# Patient Record
Sex: MALE | Race: WHITE | NOT HISPANIC OR LATINO | Employment: OTHER | URBAN - METROPOLITAN AREA
[De-identification: names, ages, dates, MRNs, and addresses within clinical notes are randomized per-mention and may not be internally consistent; named-entity substitution may affect disease eponyms.]

---

## 2017-04-06 ENCOUNTER — TRANSFERRED RECORDS (OUTPATIENT)
Dept: HEALTH INFORMATION MANAGEMENT | Facility: CLINIC | Age: 67
End: 2017-04-06

## 2017-08-30 ENCOUNTER — TRANSFERRED RECORDS (OUTPATIENT)
Dept: HEALTH INFORMATION MANAGEMENT | Facility: CLINIC | Age: 67
End: 2017-08-30

## 2018-10-18 ENCOUNTER — OFFICE VISIT (OUTPATIENT)
Dept: FAMILY MEDICINE | Facility: CLINIC | Age: 68
End: 2018-10-18
Payer: MEDICARE

## 2018-10-18 VITALS
DIASTOLIC BLOOD PRESSURE: 81 MMHG | RESPIRATION RATE: 18 BRPM | HEART RATE: 63 BPM | TEMPERATURE: 98 F | HEIGHT: 70 IN | WEIGHT: 177 LBS | SYSTOLIC BLOOD PRESSURE: 155 MMHG | OXYGEN SATURATION: 98 % | BODY MASS INDEX: 25.34 KG/M2

## 2018-10-18 DIAGNOSIS — Z85.828 HISTORY OF SKIN CANCER: ICD-10-CM

## 2018-10-18 DIAGNOSIS — L21.9 SEBORRHEIC DERMATITIS: Primary | ICD-10-CM

## 2018-10-18 DIAGNOSIS — R10.13 DYSPEPSIA: ICD-10-CM

## 2018-10-18 DIAGNOSIS — M54.6 MIDLINE THORACIC BACK PAIN, UNSPECIFIED CHRONICITY: ICD-10-CM

## 2018-10-18 DIAGNOSIS — M54.50 MIDLINE LOW BACK PAIN WITHOUT SCIATICA, UNSPECIFIED CHRONICITY: ICD-10-CM

## 2018-10-18 DIAGNOSIS — R03.0 ELEVATED BLOOD PRESSURE READING WITHOUT DIAGNOSIS OF HYPERTENSION: ICD-10-CM

## 2018-10-18 DIAGNOSIS — Z00.00 PREVENTATIVE HEALTH CARE: ICD-10-CM

## 2018-10-18 LAB
ALBUMIN UR-MCNC: NEGATIVE MG/DL
APPEARANCE UR: CLEAR
BASOPHILS # BLD AUTO: 0 10E9/L (ref 0–0.2)
BASOPHILS NFR BLD AUTO: 0.1 %
BILIRUB UR QL STRIP: NEGATIVE
COLOR UR AUTO: YELLOW
DIFFERENTIAL METHOD BLD: NORMAL
EOSINOPHIL # BLD AUTO: 0 10E9/L (ref 0–0.7)
EOSINOPHIL NFR BLD AUTO: 0.6 %
ERYTHROCYTE [DISTWIDTH] IN BLOOD BY AUTOMATED COUNT: 12.9 % (ref 10–15)
GLUCOSE UR STRIP-MCNC: NEGATIVE MG/DL
HCT VFR BLD AUTO: 41.7 % (ref 40–53)
HGB BLD-MCNC: 13.5 G/DL (ref 13.3–17.7)
HGB UR QL STRIP: NEGATIVE
KETONES UR STRIP-MCNC: NEGATIVE MG/DL
LEUKOCYTE ESTERASE UR QL STRIP: NEGATIVE
LYMPHOCYTES # BLD AUTO: 1.2 10E9/L (ref 0.8–5.3)
LYMPHOCYTES NFR BLD AUTO: 17.1 %
MCH RBC QN AUTO: 30.6 PG (ref 26.5–33)
MCHC RBC AUTO-ENTMCNC: 32.4 G/DL (ref 31.5–36.5)
MCV RBC AUTO: 95 FL (ref 78–100)
MONOCYTES # BLD AUTO: 0.5 10E9/L (ref 0–1.3)
MONOCYTES NFR BLD AUTO: 7.1 %
NEUTROPHILS # BLD AUTO: 5.2 10E9/L (ref 1.6–8.3)
NEUTROPHILS NFR BLD AUTO: 75.1 %
NITRATE UR QL: NEGATIVE
PH UR STRIP: 5 PH (ref 5–7)
PLATELET # BLD AUTO: 244 10E9/L (ref 150–450)
RBC # BLD AUTO: 4.41 10E12/L (ref 4.4–5.9)
SOURCE: NORMAL
SP GR UR STRIP: 1.02 (ref 1–1.03)
UROBILINOGEN UR STRIP-ACNC: 0.2 EU/DL (ref 0.2–1)
WBC # BLD AUTO: 6.9 10E9/L (ref 4–11)

## 2018-10-18 PROCEDURE — 80053 COMPREHEN METABOLIC PANEL: CPT | Performed by: FAMILY MEDICINE

## 2018-10-18 PROCEDURE — 99203 OFFICE O/P NEW LOW 30 MIN: CPT | Performed by: FAMILY MEDICINE

## 2018-10-18 PROCEDURE — 81003 URINALYSIS AUTO W/O SCOPE: CPT | Performed by: FAMILY MEDICINE

## 2018-10-18 PROCEDURE — 84443 ASSAY THYROID STIM HORMONE: CPT | Performed by: FAMILY MEDICINE

## 2018-10-18 PROCEDURE — 85025 COMPLETE CBC W/AUTO DIFF WBC: CPT | Performed by: FAMILY MEDICINE

## 2018-10-18 PROCEDURE — 80061 LIPID PANEL: CPT | Performed by: FAMILY MEDICINE

## 2018-10-18 PROCEDURE — 36415 COLL VENOUS BLD VENIPUNCTURE: CPT | Performed by: FAMILY MEDICINE

## 2018-10-18 NOTE — MR AVS SNAPSHOT
After Visit Summary   10/18/2018    Ignacio Sarmiento    MRN: [id number]           Patient Information     Date Of Birth          1950        Visit Information        Provider Department      10/18/2018 2:30 PM Julio Cesar Chávez MD [facility]        Today's Diagnoses     Seborrheic dermatitis    -  1    History of skin cancer        Dyspepsia        Midline thoracic back pain, unspecified chronicity        Elevated blood pressure reading without diagnosis of hypertension        Preventative health care        Midline low back pain without sciatica, unspecified chronicity          Care Instructions    Famotidine (pepcid) 20 or ranitidine(zantac) 150    New Shingles Vaccination @ pharmacy              Follow-ups after your visit        Additional Services     DERMATOLOGY REFERRAL       Your provider has referred you to: [facility]: [facility] [place] [phone]    Please be aware that coverage of these services is subject to the terms and limitations of your health insurance plan.  Call member services at your health plan with any benefit or coverage questions.      Please bring the following with you to your appointment:    (1) Any X-Rays, CTs or MRIs which have been performed.  Contact the facility where they were done to arrange for  prior to your scheduled appointment.    (2) List of current medications  (3) This referral request   (4) Any documents/labs given to you for this referral                  Follow-up notes from your care team     Return in about 1 year (around 10/18/2019).      Who to contact     If you have questions or need follow up information about today's clinic visit or your schedule please contact [facility] directly at [phone].  Normal or non-critical lab and imaging results will be communicated to you by MyChart, letter or phone within 4 business days after the clinic has received the results. If you do not  "hear from us within 7 days, please contact the clinic through Pipette or phone. If you have a critical or abnormal lab result, we will notify you by phone as soon as possible.  Submit refill requests through Pipette or call your pharmacy and they will forward the refill request to us. Please allow 3 business days for your refill to be completed.          Additional Information About Your Visit        MapMyID[place] Information     Pipette lets you send messages to your doctor, view your test results, renew your prescriptions, schedule appointments and more. To sign up, go to [email or web address] . Click on \"Log in\" on the left side of the screen, which will take you to the Welcome page. Then click on \"Sign up Now\" on the right side of the page.     You will be asked to enter the access code listed below, as well as some personal information. Please follow the directions to create your username and password.     Your access code is: [email or web address]  Expires: 2019  3:34 PM     Your access code will  in 90 days. If you need help or a new code, please call your [place] clinic or [phone].        Care EveryWhere ID     This is your Care EveryWhere ID. This could be used by other organizations to access your [place] medical records  [id number]        Your Vitals Were     Pulse Temperature Respirations Height Pulse Oximetry BMI (Body Mass Index)    63 98  F (36.7  C) (Oral) 18 5' 9.5\" (1.765 m) 98% 25.76 kg/m2       Blood Pressure from Last 3 Encounters:   10/18/18 155/81    Weight from Last 3 Encounters:   10/18/18 177 lb (80.3 kg)              We Performed the Following     *UA reflex to Microscopic and Culture ([facility] (except [place] and [place])     CBC with platelets and differential     Comprehensive metabolic panel     DERMATOLOGY REFERRAL     Lipid panel reflex to direct LDL Non-fasting     TSH with free T4 reflex          Today's Medication Changes          These changes are " accurate as of 10/18/18 11:59 PM.  If you have any questions, ask your nurse or doctor.               Start taking these medicines.        Dose/Directions    nabumetone 750 MG tablet   Commonly known as:  RELAFEN   Used for:  Midline thoracic back pain, unspecified chronicity   Started by:  Julio Cesar Chávez MD        Dose:  750 mg   Take 1 tablet (750 mg) by mouth daily   Quantity:  90 tablet   Refills:  0            Where to get your medicines      These medications were sent to [id number] - [place], [place] - [address]  [address], [facility] [address]     Phone:  [phone]     nabumetone 750 MG tablet                Primary Care Provider Fax #    Physician No Ref-Primary [phone]       No address on file        Equal Access to Services     SVEN CONNORS : Trista pererao Soclaudiaali, waaxda luqadaha, qaybta kaalmada adeegyada, carlos jean. So [facility] [phone].    ATENCIÓN: Si habla español, tiene a silver disposición servicios gratuitos de asistencia lingüística. Ll[facility] [phone].    We comply with applicable federal civil rights laws and [place] laws. We do not discriminate on the basis of race, color, national origin, age, disability, sex, sexual orientation, or gender identity.            Thank you!     Thank you for choosing [facility]  for your care. Our goal is always to provide you with excellent care. Hearing back from our patients is one way we can continue to improve our services. Please take a few minutes to complete the written survey that you may receive in the mail after your visit with us. Thank you!             Your Updated Medication List - Protect others around you: Learn how to safely use, store and throw away your medicines at www.disposemymeds.org.          This list is accurate as of 10/18/18 11:59 PM.  Always use your most recent med list.                   Brand Name Dispense Instructions for use  Diagnosis    nabumetone 750 MG tablet    RELAFEN    90 tablet    Take 1 tablet (750 mg) by mouth daily    Midline thoracic back pain, unspecified chronicity

## 2018-10-18 NOTE — PROGRESS NOTES
SUBJECTIVE:   Ignacio Sarmiento is a 68 year old male who presents to clinic today for the following health issues:    Seborrheic dermatitis  (primary encounter diagnosis) -uses a topical steroid.  Current to [facility] from 2011.  He would like a refill no current activity    History of skin cancer - Basal cell.  He would like a dermatology referral    Dyspepsia - Using wife's Prilosec for heart burn occ occurs when weightlifting.    Midline thoracic back pain, unspecified chronicity  -occurs when bench pressing.  Stops when he quits his stop bench pressing lately.  It does not occur with other physical activity.  He is stopped bench pressing, and it has not recurred   no associated symptoms    Elevated blood pressure reading without diagnosis of hypertension - Patient experiences white coat syndrome. Has at home BP monitor.  BP Readings from Last 3 Encounters:   10/18/18 155/81     Repeat similar    HPI      Past Medical History:   Diagnosis Date     Cataracts, bilateral      Dyspepsia 10/18/2018     Elevated blood pressure reading without diagnosis of hypertension 10/18/2018     History of skin cancer 10/18/2018     Midline low back pain without sciatica 10/19/2018     Midline thoracic back pain, unspecified chronicity 10/18/2018     Seborrheic dermatitis 10/18/2018       Past Surgical History:   Procedure Laterality Date     ARTHROSCOPY KNEE Right      ARTHROSCOPY KNEE WITH MENISCECTOMY Left     open       History reviewed. No pertinent family history.    Social History   Substance Use Topics     Smoking status: Former Smoker     Smokeless tobacco: Never Used     Alcohol use Yes      Comment: socially     [facility]         ROS:  No systemic symptoms  No itch  No chest pain  No dyspnea  No edema [facility]      This document serves as a record of the services and decisions personally performed and made by Julio Cesar Chávez  "MD. It was created on his behalf by Tova Hayes, a trained medical scribe. The creation of this document is based on the provider's statements to the medical scribe.  Tova Hayes October 18, 2018 3:17 PM      OBJECTIVE:     /81 (BP Location: Left arm, Patient Position: Chair, Cuff Size: Adult Large)  Pulse 63  Temp 98  F (36.7  C) (Oral)  Resp 18  Ht 5' 9.5\" (1.765 m)  Wt 177 lb (80.3 kg)  SpO2 98%  BMI 25.76 kg/m2  Body mass index is 25.76 kg/(m^2).  CHEST: Clear to auscultation, respirations unlabored  HEART: S1S2 RRR no murmur nor apical displacement  ABDOMEN: soft, nontender, no hepatosplenomegaly, no masses and bowel sounds normal  No edema no synovitis    back is nontender to percussion  And inspection    Diagnostic Test Results:  Results for orders placed or performed in visit on 10/18/18 (from the past 24 hour(s))   CBC with platelets and differential   Result Value Ref Range    WBC 6.9 4.0 - 11.0 10e9/L    RBC Count 4.41 4.4 - 5.9 10e12/L    Hemoglobin 13.5 13.3 - 17.7 g/dL    Hematocrit 41.7 40.0 - 53.0 %    MCV 95 78 - 100 fl    MCH 30.6 26.5 - 33.0 pg    MCHC 32.4 31.5 - 36.5 g/dL    RDW 12.9 10.0 - 15.0 %    Platelet Count 244 150 - 450 10e9/L    % Neutrophils 75.1 %    % Lymphocytes 17.1 %    % Monocytes 7.1 %    % Eosinophils 0.6 %    % Basophils 0.1 %    Absolute Neutrophil 5.2 1.6 - 8.3 10e9/L    Absolute Lymphocytes 1.2 0.8 - 5.3 10e9/L    Absolute Monocytes 0.5 0.0 - 1.3 10e9/L    Absolute Eosinophils 0.0 0.0 - 0.7 10e9/L    Absolute Basophils 0.0 0.0 - 0.2 10e9/L    Diff Method Automated Method    *UA reflex to Microscopic and Culture ([place] and [facility] (except [place] and [place])   Result Value Ref Range    Color Urine Yellow     Appearance Urine Clear     Glucose Urine Negative NEG^Negative mg/dL    Bilirubin Urine Negative NEG^Negative    Ketones Urine Negative NEG^Negative mg/dL    Specific Gravity Urine 1.020 1.003 - 1.035    Blood Urine Negative NEG^Negative "    pH Urine 5.0 5.0 - 7.0 pH    Protein Albumin Urine Negative NEG^Negative mg/dL    Urobilinogen Urine 0.2 0.2 - 1.0 EU/dL    Nitrite Urine Negative NEG^Negative    Leukocyte Esterase Urine Negative NEG^Negative    Source Midstream Urine        ASSESSMENT/PLAN:   ASSESSMENT / PLAN:  (L21.9) Seborrheic dermatitis  (primary encounter diagnosis)  Comment: At the moment quiescent  Plan: Discussed    (Z85.828) History of skin cancer  Comment:  no particular lesions at this time  Plan: DERMATOLOGY REFERRAL         Per his request    (R10.13) Dyspepsia  Comment: Discussed Prilosec study  Plan: He is going to try to do without PPI,  ranitidine     (M54.6) Midline thoracic back pain, unspecified chronicity  Comment: Discussed physical therapy  Plan: nabumetone (RELAFEN) 750 MG tablet        [facility] we should check an alkaline phosphatase    (R03.0) Elevated blood pressure reading without diagnosis of hypertension  Comment: He believes this is whitecoat  Plan: *He will get blood pressures here 3 times weekly     (Z00.00) Preventative health care  Comment: Our database is slim  Plan: CBC with platelets and differential, TSH with         free T4 reflex, Comprehensive metabolic panel,         *UA reflex to Microscopic and Culture ([place]         and [facility] (except [place] and         [place]), Lipid panel reflex to direct LDL         Non-fasting     Records requested from the [facility]    (M54.5) Midline low back pain without sciatica, unspecified chronicity  Comment: He treats this with exercise  Plan: Positive strokes          Julio Cesar Chávez MD      The information in this document, created by the medical scribe for me, accurately reflects the services I personally performed and the decisions made by me. I have reviewed and approved this document for accuracy prior to leaving the patient care area.  October 18, 2018 3:17 PM    Julio Cesar Chávez MD  [facility]

## 2018-10-19 PROBLEM — M54.50 MIDLINE LOW BACK PAIN WITHOUT SCIATICA: Status: ACTIVE | Noted: 2018-10-19

## 2018-10-19 LAB
ALBUMIN SERPL-MCNC: 4.1 G/DL (ref 3.4–5)
ALP SERPL-CCNC: 68 U/L (ref 40–150)
ALT SERPL W P-5'-P-CCNC: 21 U/L (ref 0–70)
ANION GAP SERPL CALCULATED.3IONS-SCNC: 5 MMOL/L (ref 3–14)
AST SERPL W P-5'-P-CCNC: 16 U/L (ref 0–45)
BILIRUB SERPL-MCNC: 0.6 MG/DL (ref 0.2–1.3)
BUN SERPL-MCNC: 12 MG/DL (ref 7–30)
CALCIUM SERPL-MCNC: 9.2 MG/DL (ref 8.5–10.1)
CHLORIDE SERPL-SCNC: 105 MMOL/L (ref 94–109)
CHOLEST SERPL-MCNC: 205 MG/DL
CO2 SERPL-SCNC: 29 MMOL/L (ref 20–32)
CREAT SERPL-MCNC: 0.86 MG/DL (ref 0.66–1.25)
GFR SERPL CREATININE-BSD FRML MDRD: 88 ML/MIN/1.7M2
GLUCOSE SERPL-MCNC: 113 MG/DL (ref 70–99)
HDLC SERPL-MCNC: 84 MG/DL
LDLC SERPL CALC-MCNC: 101 MG/DL
NONHDLC SERPL-MCNC: 121 MG/DL
POTASSIUM SERPL-SCNC: 3.8 MMOL/L (ref 3.4–5.3)
PROT SERPL-MCNC: 7.8 G/DL (ref 6.8–8.8)
SODIUM SERPL-SCNC: 139 MMOL/L (ref 133–144)
TRIGL SERPL-MCNC: 99 MG/DL
TSH SERPL DL<=0.005 MIU/L-ACNC: 1.43 MU/L (ref 0.4–4)

## 2018-10-19 NOTE — PROGRESS NOTES
Tests are all quite good. Your heart attack risk, however, is 1 in 6, which we consider high. I would recommend a statin, as we discussed. May I prescribe it?      ANDREY SANTANA

## 2018-10-22 ENCOUNTER — TELEPHONE (OUTPATIENT)
Dept: FAMILY MEDICINE | Facility: CLINIC | Age: 68
End: 2018-10-22

## 2018-10-22 DIAGNOSIS — E78.5 HYPERLIPIDEMIA LDL GOAL <160: Primary | ICD-10-CM

## 2018-10-22 RX ORDER — ATORVASTATIN CALCIUM 40 MG/1
40 TABLET, FILM COATED ORAL DAILY
Qty: 90 TABLET | Refills: 3 | Status: SHIPPED | OUTPATIENT
Start: 2018-10-22 | End: 2019-10-02

## 2018-10-22 NOTE — TELEPHONE ENCOUNTER
Results and message given to patient, he states he would like his statin sent to [facility] in [place], he states he is currently at the airport heading to [place], he would like to make a phone visit when he gets settled in his condo to go through results with Dr. ANISH Cuevas/[facility]

## 2018-10-24 ENCOUNTER — TELEPHONE (OUTPATIENT)
Dept: FAMILY MEDICINE | Facility: CLINIC | Age: 68
End: 2018-10-24

## 2018-10-24 NOTE — TELEPHONE ENCOUNTER
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  Earliest return to [place] Nov 5. Received our call with lab results and statin recommendation.  Ignacio considering taking med, will discuss with pharmacist and call back if decides NOT to take atorvastatin 40 mg.   Comments about his BP white coat syndrome  BP Readings from Last 1 Encounters:   10/18/18 155/81     Pt states will monitor BP in [place] and call with log.     His sister, a nurse, recommended he stop running because heart attack risk is 1 in 6 which we consider high   Runs every other day 4 - 4-1/2 miles at high pace.  OK to continue running?   He will stop running until BW comments.    # above OK detailed message.  Ganga Whittaker, RN

## 2018-10-25 NOTE — TELEPHONE ENCOUNTER
Pt calls, discussed lipids at length, pt 5 hours earlier in [place] so wants no early calls unless needed, worried them, did  atorvastatin in [place], will be back early November, [facility], will call prn, informed recommendation per ANISH Melendez, RN, BSN  Message handled by Nurse Triage.

## 2018-11-05 ENCOUNTER — VIRTUAL VISIT (OUTPATIENT)
Dept: FAMILY MEDICINE | Facility: CLINIC | Age: 68
End: 2018-11-05
Payer: MEDICARE

## 2018-11-05 DIAGNOSIS — R03.0 ELEVATED BLOOD PRESSURE READING WITHOUT DIAGNOSIS OF HYPERTENSION: ICD-10-CM

## 2018-11-05 DIAGNOSIS — E78.5 HYPERLIPIDEMIA LDL GOAL <160: Primary | ICD-10-CM

## 2018-11-05 PROCEDURE — 99207 ZZC NO BILLABLE SERVICE THIS VISIT: CPT | Performed by: FAMILY MEDICINE

## 2018-11-05 NOTE — PROGRESS NOTES
SUBJECTIVE:   Ignacio Sarmiento is a 68 year old male who presents to clinic today for the following health issues:          GO over lab results, Virtual visit     Problem list and histories reviewed & adjusted, as indicated.  Additional history: none        Reviewed and updated as needed this visit by clinical staff  Tobacco  Allergies  Meds  Med Hx  Surg Hx  Fam Hx  Soc Hx      Reviewed and updated as needed this visit by Provider                             The 10-year ASCVD risk score (Arash CLINTON Jr, et al., 2013) is: 15.7%    Values used to calculate the score:      Age: 68 years      Sex: Male      Is Non- : No      Diabetic: No      Tobacco smoker: No      Systolic Blood Pressure: 155 mmHg      Is BP treated: No      HDL Cholesterol: 84 mg/dL      Total Cholesterol: 205 mg/dL      Telephone conversation regarding cholesterol atorvastatin side effects benefit.  All questions answered.  He still has to have his blood pressure rechecked.  7 minutes  Julio Cesar Chávez                    [facility] M left  Julio Cesar Chávez

## 2018-11-05 NOTE — MR AVS SNAPSHOT
"              After Visit Summary   2018    Ignacio Sarmiento    MRN: [id number]           Patient Information     Date Of Birth          1950        Visit Information        Provider Department      2018 2:00 PM Julio Cesar Chávez MD [facility]        Today's Diagnoses     Hyperlipidemia LDL goal <160    -  1    Elevated blood pressure reading without diagnosis of hypertension           Follow-ups after your visit        Follow-up notes from your care team     Return in about 3 months (around 2019).      Who to contact     If you have questions or need follow up information about today's clinic visit or your schedule please contact [facility] directly at [phone].  Normal or non-critical lab and imaging results will be communicated to you by MyChart, letter or phone within 4 business days after the clinic has received the results. If you do not hear from us within 7 days, please contact the clinic through MyChart or phone. If you have a critical or abnormal lab result, we will notify you by phone as soon as possible.  Submit refill requests through Lionical or call your pharmacy and they will forward the refill request to us. Please allow 3 business days for your refill to be completed.          Additional Information About Your Visit        MyChart Information     Lionical lets you send messages to your doctor, view your test results, renew your prescriptions, schedule appointments and more. To sign up, go to [email or web address] . Click on \"Log in\" on the left side of the screen, which will take you to the Welcome page. Then click on \"Sign up Now\" on the right side of the page.     You will be asked to enter the access code listed below, as well as some personal information. Please follow the directions to create your username and password.     Your access code is: [email or web address]  Expires: 2019  2:34 PM     Your access code will  in 90 days. " If you need help or a new code, please call your [place] clinic or [phone].        Care EveryWhere ID     This is your Care EveryWhere ID. This could be used by other organizations to access your [place] medical records  [id number]         Blood Pressure from Last 3 Encounters:   10/18/18 155/81    Weight from Last 3 Encounters:   10/18/18 177 lb (80.3 kg)              Today, you had the following     No orders found for display       Primary Care Provider Office Phone # Fax #    Julio Cesar Chávez [phone] [address] [facility] [address]        Equal Access to Services     [facility]: Hadii aad ku hadasho Soomaali, waaxda luqadaha, qaybta kaalmada adeegyada, waxcastillo horn hayjanny shah . So [facility] [phone].    ATENCIÓN: Si habla español, tiene a silver disposición servicios gratuitos de asistencia lingüística. [facility] [phone].    We comply with applicable federal civil rights laws and [place] laws. We do not discriminate on the basis of race, color, national origin, age, disability, sex, sexual orientation, or gender identity.            Thank you!     Thank you for choosing [facility]  for your care. Our goal is always to provide you with excellent care. Hearing back from our patients is one way we can continue to improve our services. Please take a few minutes to complete the written survey that you may receive in the mail after your visit with us. Thank you!             Your Updated Medication List - Protect others around you: Learn how to safely use, store and throw away your medicines at www.disposemymeds.org.          This list is accurate as of 11/5/18  2:29 PM.  Always use your most recent med list.                   Brand Name Dispense Instructions for use Diagnosis    atorvastatin 40 MG tablet    LIPITOR    90 tablet    Take 1 tablet (40 mg) by mouth daily    Hyperlipidemia LDL goal <160       nabumetone 750 MG tablet     RELAFEN    90 tablet    Take 1 tablet (750 mg) by mouth daily    Midline thoracic back pain, unspecified chronicity

## 2018-11-09 PROBLEM — M19.079: Status: ACTIVE | Noted: 2018-11-09

## 2019-02-15 ENCOUNTER — TELEPHONE (OUTPATIENT)
Dept: FAMILY MEDICINE | Facility: CLINIC | Age: 69
End: 2019-02-15

## 2019-02-15 NOTE — TELEPHONE ENCOUNTER
Panel Management Review      Patient has the following on his problem list: None      Composite cancer screening  Chart review shows that this patient is due/due soon for the following Colonoscopy  Summary:    Patient is due/failing the following:   COLONOSCOPY    Action needed:   Patient needs referral/order: colonoscopy    Type of outreach:    panel pool    Questions for provider review:    None                                                                                                                                    Angy Cuevas/OSBALDO  [place]---[facility]       Chart routed to Care Team .

## 2019-02-28 NOTE — TELEPHONE ENCOUNTER
Type of outreach:  Phone, spoke to patient.  Patient had Colonoscopy somewhere else but not sure when. he will call us back when he figure out when his last one was.

## 2019-04-17 ENCOUNTER — ANCILLARY PROCEDURE (OUTPATIENT)
Dept: GENERAL RADIOLOGY | Facility: CLINIC | Age: 69
End: 2019-04-17
Attending: PHYSICIAN ASSISTANT
Payer: COMMERCIAL

## 2019-04-17 ENCOUNTER — OFFICE VISIT (OUTPATIENT)
Dept: URGENT CARE | Facility: URGENT CARE | Age: 69
End: 2019-04-17
Payer: COMMERCIAL

## 2019-04-17 VITALS
TEMPERATURE: 97.5 F | SYSTOLIC BLOOD PRESSURE: 162 MMHG | RESPIRATION RATE: 16 BRPM | OXYGEN SATURATION: 99 % | DIASTOLIC BLOOD PRESSURE: 80 MMHG | HEART RATE: 57 BPM

## 2019-04-17 DIAGNOSIS — T14.8XXA PUNCTURE WOUND: Primary | ICD-10-CM

## 2019-04-17 DIAGNOSIS — R22.31 MASS OF RIGHT FINGER: ICD-10-CM

## 2019-04-17 DIAGNOSIS — T14.8XXA PUNCTURE WOUND: ICD-10-CM

## 2019-04-17 DIAGNOSIS — M79.645 PAIN OF FINGER OF LEFT HAND: ICD-10-CM

## 2019-04-17 DIAGNOSIS — Z23 NEED FOR TDAP VACCINATION: ICD-10-CM

## 2019-04-17 PROCEDURE — 90471 IMMUNIZATION ADMIN: CPT | Performed by: PHYSICIAN ASSISTANT

## 2019-04-17 PROCEDURE — 90715 TDAP VACCINE 7 YRS/> IM: CPT | Performed by: PHYSICIAN ASSISTANT

## 2019-04-17 PROCEDURE — 73140 X-RAY EXAM OF FINGER(S): CPT | Mod: LT

## 2019-04-17 PROCEDURE — 99214 OFFICE O/P EST MOD 30 MIN: CPT | Mod: 25 | Performed by: PHYSICIAN ASSISTANT

## 2019-04-17 RX ORDER — HYDROCODONE BITARTRATE AND ACETAMINOPHEN 5; 325 MG/1; MG/1
1 TABLET ORAL
Qty: 4 TABLET | Refills: 0 | Status: SHIPPED | OUTPATIENT
Start: 2019-04-17 | End: 2019-07-01

## 2019-04-17 NOTE — PATIENT INSTRUCTIONS
Patient Education     Puncture Wound    A puncture wound occurs when a pointed object pushes into the skin. It may go into the tissues below the skin, including fat and muscle. This type of wound is narrow and deep and can be hard to clean. Because of this, puncture wounds are at high risk for becoming infected.  X-rays may be done to check the wound for objects stuck under the skin. Your may also need a tetanus shot. This is given if you are not up-to-date on this vaccination and the object that caused the wound may lead to tetanus.  Home care    Your healthcare provider may prescribe an antibiotic. This is to help prevent infection. Follow all instructions for taking this medicine. Take the medicine every day until it is gone or you are told to stop. You should not have any left over.    The healthcare provider may prescribe medicines for pain. Follow instructions for taking them.    You can take acetaminophen or ibuprofen for pain, unless you were given a different pain medicine to use.     Follow the healthcare provider s instructions on how to care for the wound.    Keep the wound clean and dry. Don't get the wound wet until you are told it is OK to do so. If the area gets wet, gently pat it dry with a clean cloth. Replace the wet bandage with a dry one.    If a bandage was applied and it becomes wet or dirty, replace it. Otherwise, leave it in place for the first 24 hours.    Once you can get the wound wet, you may shower as usual but don't soak the wound in water (no tub baths or swimming)    Even with proper treatment, a puncture wound may become infected. Check the wound daily for signs of infection listed below.  Follow-up care  Follow up with your healthcare provider, or as advised.   When to seek medical advice  Call your healthcare provider right away if any of these occur:    Signs of infection, including:  ? Increasing redness or swelling around the wound  ? Increased warmth of the  wound  ? Worsening pain  ? Red streaking lines away from the wound  ? Draining pus    Fever of 100.4 F (38. C) or higher, or as directed by your healthcare provider    Wound changes colors    Numbness around the wound    Decreased movement around the injured area  Date Last Reviewed: 3/1/2018    3074-2860 [employer]. [address] [place] [address]. All rights reserved. This information is not intended as a substitute for professional medical care. Always follow your healthcare professional's instructions.

## 2019-04-17 NOTE — PROGRESS NOTES
SUBJECTIVE:     Chief Complaint   Patient presents with     Urgent Care     Drill left thumb     Ignacio Sarmiento is a 68 year old male who presents to the clinic with a puncture on the left finger sustained 1 hour(s) ago.  Punctured across pad of thumb with no exit wound.   This is a non-work related injury.  Mechanism of injury: drill bit.  No loss of sensation or strength.  Patient also notes non-painful mobile mass at right thumb joint.  Not sure how long it has been present.     Associated symptoms: Denies numbness, weakness, or loss of function  Last tetanus booster within 10 years: no    No family history on file.    Social  Just moved home from [facility] Hx:   Non-contributory      ROS:  10 point ROS negative except as listed above        EXAM:   The patient appears today in alert,no apparent distress distress  VITALS: /80   Pulse 57   Temp 97.5  F (36.4  C) (Tympanic)   Resp 16   SpO2 99%   GEN: Alert, no distress  EYES: conj clear  CARD: cap refill rapid  PULM: no labored breathing  MS: no abnormalities  NEURO: Sensation intact  Skin: Small puncture with multiple rough edges, bruising on contralateral aspect      Size of puncture: 3 millimeters  Characteristics of the laceration: puncture  Tendon function intact: yes  Sensation to light touch intact: yes  Pulses intact: not applicable  Picture included in patient's chart: no    Finger Xray: no evidence of bone injury, FB or gas on initial exam    Assessment:  (T14.8XXA) Puncture wound  (primary encounter diagnosis)  Comment: small with frayed edges, non sutureable  Plan: XR Finger Left G/E 2 Views, cephALEXin (KEFLEX)        250 MG capsule           (Z23) Need for Tdap vaccination  Plan: INJECTION INTRAMUSCULAR OR SUB-Q, TDAP, IM (10         - 64 YRS) - Adacel      (R22.31) Mass of right finger  Comment: unclear etiology likely benign cyst  Plan: Follow up with PCP    (U38.575) Pain of finger of left hand  Comment: swelling and  location of injury will cause significant discomfort impeding sleep and haling process  Plan: HYDROcodone-acetaminophen (NORCO) 5-325 MG         tablet          PLAN:  PROCEDURE NOTE::  Wound cleaned with chlorhexadine  Wound soaked  Wound irrigated  1 streri-strip applied, tube guaze to limit ROM.  Splints and splinting recommendations provided  After care instructions:  Keep wound clean and dry for the next 24-48 hours  Signs of infection discussed today  May return to work as long as wound is kept clean and dry  Discussed the probability of scarring

## 2019-05-28 ENCOUNTER — TRANSFERRED RECORDS (OUTPATIENT)
Dept: HEALTH INFORMATION MANAGEMENT | Facility: CLINIC | Age: 69
End: 2019-05-28

## 2019-07-01 ENCOUNTER — OFFICE VISIT (OUTPATIENT)
Dept: FAMILY MEDICINE | Facility: CLINIC | Age: 69
End: 2019-07-01
Payer: COMMERCIAL

## 2019-07-01 VITALS
DIASTOLIC BLOOD PRESSURE: 81 MMHG | HEIGHT: 70 IN | WEIGHT: 179 LBS | OXYGEN SATURATION: 99 % | RESPIRATION RATE: 16 BRPM | TEMPERATURE: 97.7 F | SYSTOLIC BLOOD PRESSURE: 178 MMHG | HEART RATE: 60 BPM | BODY MASS INDEX: 25.62 KG/M2

## 2019-07-01 DIAGNOSIS — R55 SYNCOPE, UNSPECIFIED SYNCOPE TYPE: ICD-10-CM

## 2019-07-01 DIAGNOSIS — R03.0 ELEVATED BLOOD PRESSURE READING WITHOUT DIAGNOSIS OF HYPERTENSION: ICD-10-CM

## 2019-07-01 DIAGNOSIS — E78.5 HYPERLIPIDEMIA LDL GOAL <160: ICD-10-CM

## 2019-07-01 DIAGNOSIS — R07.81 RIB PAIN: Primary | ICD-10-CM

## 2019-07-01 DIAGNOSIS — I10 SYSTOLIC HYPERTENSION: ICD-10-CM

## 2019-07-01 LAB
BASOPHILS # BLD AUTO: 0 10E9/L (ref 0–0.2)
BASOPHILS NFR BLD AUTO: 0.1 %
DIFFERENTIAL METHOD BLD: NORMAL
EOSINOPHIL # BLD AUTO: 0 10E9/L (ref 0–0.7)
EOSINOPHIL NFR BLD AUTO: 0.4 %
ERYTHROCYTE [DISTWIDTH] IN BLOOD BY AUTOMATED COUNT: 13.1 % (ref 10–15)
HCT VFR BLD AUTO: 44.7 % (ref 40–53)
HGB BLD-MCNC: 14.8 G/DL (ref 13.3–17.7)
LYMPHOCYTES # BLD AUTO: 1 10E9/L (ref 0.8–5.3)
LYMPHOCYTES NFR BLD AUTO: 10.6 %
MCH RBC QN AUTO: 31 PG (ref 26.5–33)
MCHC RBC AUTO-ENTMCNC: 33.1 G/DL (ref 31.5–36.5)
MCV RBC AUTO: 94 FL (ref 78–100)
MONOCYTES # BLD AUTO: 0.7 10E9/L (ref 0–1.3)
MONOCYTES NFR BLD AUTO: 7.7 %
NEUTROPHILS # BLD AUTO: 7.6 10E9/L (ref 1.6–8.3)
NEUTROPHILS NFR BLD AUTO: 81.2 %
PLATELET # BLD AUTO: 245 10E9/L (ref 150–450)
RBC # BLD AUTO: 4.77 10E12/L (ref 4.4–5.9)
WBC # BLD AUTO: 9.3 10E9/L (ref 4–11)

## 2019-07-01 PROCEDURE — 93000 ELECTROCARDIOGRAM COMPLETE: CPT | Performed by: FAMILY MEDICINE

## 2019-07-01 PROCEDURE — 99214 OFFICE O/P EST MOD 30 MIN: CPT | Performed by: FAMILY MEDICINE

## 2019-07-01 PROCEDURE — 36415 COLL VENOUS BLD VENIPUNCTURE: CPT | Performed by: FAMILY MEDICINE

## 2019-07-01 PROCEDURE — 85025 COMPLETE CBC W/AUTO DIFF WBC: CPT | Performed by: FAMILY MEDICINE

## 2019-07-01 RX ORDER — ATORVASTATIN CALCIUM 40 MG/1
40 TABLET, FILM COATED ORAL DAILY
Qty: 90 TABLET | Refills: 3 | Status: SHIPPED | OUTPATIENT
Start: 2019-07-01 | End: 2019-10-02

## 2019-07-01 ASSESSMENT — MIFFLIN-ST. JEOR: SCORE: 1585.25

## 2019-07-01 NOTE — PROGRESS NOTES
"Subjective     Ignacio Sarmiento is a 68 year old male who presents to clinic today for the following health issues:    HPI   Concern - rib pain  Onset: 3 days ago    Description:   Fell and injured rib    Intensity: severe    Progression of Symptoms:  same    Accompanying Signs & Symptoms:  Fell and hit head and back    Working outside, collapsed. Denies CP palpitations. LOC. Contused occiput and L post chest wall. \"after I woke up\" arose, in house profuse diaphoresis, no symptoms since except L post chest wall pain.    Past Medical History:   Diagnosis Date     Cataracts, bilateral      Dyspepsia 10/18/2018     Elevated blood pressure reading without diagnosis of hypertension 10/18/2018     History of skin cancer 10/18/2018     Hyperlipidemia LDL goal <160 11/5/2018     Midline low back pain without sciatica 10/19/2018     Midline thoracic back pain, unspecified chronicity 10/18/2018     Primary osteoarthritis of ankle, unspecified laterality 11/9/2018     Seborrheic dermatitis 10/18/2018     Syncope 7/1/2019       Past Surgical History:   Procedure Laterality Date     ARTHROSCOPY KNEE Right      ARTHROSCOPY KNEE WITH MENISCECTOMY Left     open       History reviewed. No pertinent family history.    Social History     Tobacco Use     Smoking status: Former Smoker     Smokeless tobacco: Never Used   Substance Use Topics     Alcohol use: Yes     Comment: beer daily       Hyperlipidemia LDL goal <160 statin not filled \" no evidence that they work\"      Reviewed and updated as needed this visit by Provider                 Review of Systems  No vertigo, nl BM, no GI sx        Objective    /81 (BP Location: Right arm, Patient Position: Chair, Cuff Size: Adult Large)   Pulse 60   Temp 97.7  F (36.5  C) (Oral)   Resp 16   Ht 1.765 m (5' 9.5\")   Wt 81.2 kg (179 lb)   SpO2 99%   BMI 26.05 kg/m    Body mass index is 26.05 kg/m .  Physical Exam   BP Readings from Last 6 Encounters:   07/01/19 178/81 "   04/17/19 162/80   10/18/18 155/81     No thyromegaly, trachea midline, no nodes  Neg hemotympanum, racoon's eyes, intranasal blood, nor  Biswas's sign.Neck supple.    HEART: S1S2 RRR no murmur nor apical displacement  CHEST: Clear to auscultation, respirations unlabored  Skin w/o bruise  ABDOMEN without organomegaly nor tenderness to palpation  Chest wall tender to lat compression post axillary line    EKG WNL    ASSESSMENT / PLAN:  (R07.81) Rib pain  (primary encounter diagnosis)  Comment: rib belt ineffective  Plan: EKG 12-lead complete w/read - Clinics        Discussed pulm risk, parminder history. NSAID prn    (R55) Syncope, unspecified syncope type  Comment: history inconsistent. Broaden data base    Plan: Echocardiogram Complete, Zio Patch Holter Adult        Pediatric Greater than 48 hrs, CBC with         platelets and differential         (E78.5) Hyperlipidemia LDL goal <160  Comment: discussed evidence risk  Plan: atorvastatin (LIPITOR) 40 MG tablet        He believes he will start    (R03.0) Elevated blood pressure reading without diagnosis of hypertension  Comment: systolic htn  Plan: pt leery of Rx. Introduce therapy over time  Change dx to systolic htn          Julio Cesar Chávez MD

## 2019-07-25 ENCOUNTER — HOSPITAL ENCOUNTER (OUTPATIENT)
Dept: CARDIOLOGY | Facility: CLINIC | Age: 69
Discharge: HOME | End: 2019-07-25
Attending: FAMILY MEDICINE | Admitting: FAMILY MEDICINE
Payer: COMMERCIAL

## 2019-07-25 ENCOUNTER — HOSPITAL ENCOUNTER (OUTPATIENT)
Dept: CARDIOLOGY | Facility: CLINIC | Age: 69
End: 2019-07-25
Attending: FAMILY MEDICINE
Payer: COMMERCIAL

## 2019-07-25 DIAGNOSIS — R55 SYNCOPE, UNSPECIFIED SYNCOPE TYPE: ICD-10-CM

## 2019-07-25 PROCEDURE — 93306 TTE W/DOPPLER COMPLETE: CPT | Mod: 26 | Performed by: INTERNAL MEDICINE

## 2019-07-25 PROCEDURE — 0296T ZIO PATCH HOLTER ADULT PEDIATRIC GREATER THAN 48 HRS: CPT

## 2019-07-25 PROCEDURE — 0298T ZIO PATCH HOLTER ADULT PEDIATRIC GREATER THAN 48 HRS: CPT | Performed by: INTERNAL MEDICINE

## 2019-07-25 PROCEDURE — 93306 TTE W/DOPPLER COMPLETE: CPT

## 2019-08-08 ENCOUNTER — TELEPHONE (OUTPATIENT)
Dept: FAMILY MEDICINE | Facility: CLINIC | Age: 69
End: 2019-08-08

## 2019-08-08 DIAGNOSIS — I44.1 MOBITZ I: Primary | ICD-10-CM

## 2019-08-08 NOTE — TELEPHONE ENCOUNTER
Patient checking on results from the Zio Patch holter monitor.    Please advise    Johanna Yeager

## 2019-08-09 NOTE — TELEPHONE ENCOUNTER
Called patient back.  Not having any  symptoms, back to exercising and running.  Cataract surgery scheduled next Tuesday.  Wanting results ASAP.  Will watch for results.  Mariah Dobbs RN

## 2019-08-12 ENCOUNTER — TELEPHONE (OUTPATIENT)
Dept: FAMILY MEDICINE | Facility: CLINIC | Age: 69
End: 2019-08-12

## 2019-08-12 DIAGNOSIS — I44.1 MOBITZ I: ICD-10-CM

## 2019-08-12 NOTE — TELEPHONE ENCOUNTER
Phone call to patient     Advised okay to proceed with cataract surgery   Cardiology referral placed by Dr. Chávez - they will call to schedule     Patient is feeling well and has no questions/concerns at this time     Camille Styles, Registered Nurse   [facility]

## 2019-08-12 NOTE — TELEPHONE ENCOUNTER
Dr. Chávez- I am not seeing cardiology referral.  T'd up.  OK for cataract surgery tomorrow?  Mariah Dobbs, RN      Notes recorded by Julio Cesar Chávez MD on 8/12/2019 at 9:34 AM CDT  Your heart rhythm test is back. I am going to have the cardiologists visit with you.  They are likely to come up with nothing, but one of your 2 heart rhythm problems should be studied a bit further They will call.  Julio Cesar Chávez MD

## 2019-08-12 NOTE — RESULT ENCOUNTER NOTE
Your heart rhythm test is back. I am going to have the cardiologists visit with you.  They are likely to come up with nothing, but one of your 2 heart rhythm problems should be studied a bit further They will call.  Julio Cesar Chávez MD

## 2019-08-12 NOTE — TELEPHONE ENCOUNTER
Pt calls, needs copy of zio patch for cataract surgery tomorrow, copy placed up front  Elizabet Melendez RN, BSN  Message handled by Nurse Triage.

## 2019-08-13 ENCOUNTER — TELEPHONE (OUTPATIENT)
Dept: FAMILY MEDICINE | Facility: CLINIC | Age: 69
End: 2019-08-13

## 2019-08-13 NOTE — TELEPHONE ENCOUNTER
Incoming call from [facility] nurse manager     They received patient's zio patch results today and after viewing them the anesthesiologist wanted to ensure Dr. Chávez had viewed the results     Dr. Chávez did review the results - ordered cardiology consult yesterday and advised okay to continue with surgery       12:15 PM   Julio Cesar Chávez MD routed this conversation to Cr Triage   Julio Cesar Chávez MD           12:12 PM   Note      cataract surgery OK  Cardiology ordered again  Julio Cesar Chávez        Notes recorded by Julio Cesar Chávez MD on 8/12/2019 at 9:34 AM CDT  Your heart rhythm test is back. I am going to have the cardiologists visit with you.  They are likely to come up with nothing, but one of your 2 heart rhythm problems should be studied a bit further They will call.  Julio Cesar Chávez MD    Patient was notified yesterday of need to f/u with cardiology for further work up     If when patient arrives at surgery vital signs are abnormal - surgery will be cancelled   [facility] will return call with further questions/concerns     Camille Styles, Registered Nurse   [facility]

## 2019-10-02 ENCOUNTER — OFFICE VISIT (OUTPATIENT)
Dept: CARDIOLOGY | Facility: CLINIC | Age: 69
End: 2019-10-02
Attending: FAMILY MEDICINE
Payer: COMMERCIAL

## 2019-10-02 VITALS
HEIGHT: 70 IN | SYSTOLIC BLOOD PRESSURE: 130 MMHG | WEIGHT: 178 LBS | DIASTOLIC BLOOD PRESSURE: 78 MMHG | BODY MASS INDEX: 25.48 KG/M2 | HEART RATE: 78 BPM

## 2019-10-02 DIAGNOSIS — E78.2 MIXED HYPERLIPIDEMIA: ICD-10-CM

## 2019-10-02 DIAGNOSIS — I47.10 PAROXYSMAL SUPRAVENTRICULAR TACHYCARDIA (H): ICD-10-CM

## 2019-10-02 DIAGNOSIS — I44.1 MOBITZ TYPE 1 SECOND DEGREE AV BLOCK: ICD-10-CM

## 2019-10-02 DIAGNOSIS — R55 SYNCOPE, UNSPECIFIED SYNCOPE TYPE: Primary | ICD-10-CM

## 2019-10-02 PROCEDURE — 99204 OFFICE O/P NEW MOD 45 MIN: CPT | Performed by: INTERNAL MEDICINE

## 2019-10-02 RX ORDER — ROSUVASTATIN CALCIUM 20 MG/1
20 TABLET, COATED ORAL DAILY
Qty: 90 TABLET | Refills: 3 | Status: SHIPPED | OUTPATIENT
Start: 2019-10-02 | End: 2020-12-02 | Stop reason: ALTCHOICE

## 2019-10-02 RX ORDER — CHLORAL HYDRATE 500 MG
2 CAPSULE ORAL DAILY
COMMUNITY
End: 2020-12-02 | Stop reason: ALTCHOICE

## 2019-10-02 ASSESSMENT — MIFFLIN-ST. JEOR: SCORE: 1575.71

## 2019-10-02 NOTE — PATIENT INSTRUCTIONS
The heart muscle looks good. The wiring is perhaps the culprit. You will be a little sensitive to dehydration and alcohol at the same time.    If the lightheadedness and semi-passing out happen again with less extreme circumstances, then we should do more testing and consider further evaluation of your wiring.    Let's do a stress test to make sure your conduction system and blood flow to the heart muscle remains good.

## 2019-10-02 NOTE — LETTER
10/2/2019    Julio Cesar Chávez MD  [address] [address] [address]    RE: Ignacio Sarmiento       Dear Colleague,    I had the pleasure of seeing Ignacio Sarmiento in the [facility].    Cardiology Consultation      Ignacio Sarmiento MRN# [id number]   YOB: 1950 Age: 69 year old   Date of Visit 10/02/2019     Reason for consult:  Presyncope/syncope           Assessment and Plan:     1. Presyncope/syncope possibly related to inappropriate autonomic response, provoked by dehydration    We discussed that his echocardiogram is structurally normal.  He does have some intermittent second degree type I AV block that may be contributing, but his symptoms have overall improved/resolved.    At this point, if he has recurrent symptoms or worsening symptoms would pursue further monitoring and consider electronic pacemaker.  We discussed that this is possible within the next 5 to 10 years with the natural history of conduction system aging.    Will do stress echocardiogram to rule out ischemic SA/AV node or exercise-induced/rate related heart block.      If stress testing is negative, patient may follow-up with his primary physician unless symptoms worsen.      2. Hyperlipidemia, dry mouth on atorvastatin    Trial of rosuvastatin 20 mg instead which is equivalent dosing to his atorvastatin 40 mg.    Patient may have this refilled by his primary clinician if tolerated.    Patient may follow-up with his primary clinician unless symptoms escalate.    This note was transcribed using electronic voice recognition software, typographical errors may be present.                Chief Complaint:   Consult (fu  Syncope, and SVT x3 with AV Block noted on Ziopatch, HTN)           History of Present Illness:   This patient is a 69 year old male accompanied by his wife.  He had an episode in July where it was very hot outside and he was sweating profusely when he felt weak and dizzy  "when pushing a cart and went down without loss of consciousness or loss of bowel/bladder.  He fell down and broke her rib at that time.  He was even sweatier when he recovered.  He noted some episodes of lightheadedness when standing up in the middle of the night leading up to that episode.  Currently he does not have any lightheadedness.    He had a 1 week ZIO monitor with his primary physician that showed asymptomatic SVT as well as second-degree type I AV block when sleeping at night or napping in the afternoon.    He is a runner and has been back to running 10-minute miles.  He denies any exertional chest discomfort or dyspnea on exertion.    He is currently on atorvastatin and thinks this is causing him significant dry mouth.         Physical Exam:     Vitals: /78   Pulse 78   Ht 1.765 m (5' 9.5\")   Wt 80.7 kg (178 lb)   BMI 25.91 kg/m     Constitutional:  cooperative, alert and oriented, well developed, well nourished, in no acute distress        Skin:  warm and dry to the touch, no apparent skin lesions or masses noted        Head:  normocephalic, no masses or lesions        Eyes:  pupils equal and round, conjunctivae and lids unremarkable, sclera white, no xanthalasma, EOMS intact, no nystagmus        ENT:  no pallor or cyanosis, dentition good        Neck:  carotid pulses are full and equal bilaterally, JVP normal, no carotid bruit        Chest:  normal breath sounds, clear to auscultation, normal A-P diameter, normal symmetry, normal respiratory excursion, no use of accessory muscles        Cardiac: regular rhythm bradycardic     grade 1;early systolic murmur;RUSB          Abdomen:      benign    Extremities and Back:  no deformities, clubbing, cyanosis, erythema observed;no edema        Neurological:  no gross motor deficits                    Past Medical History:   I have reviewed this patient's past medical history  Past Medical History:   Diagnosis Date     Cataracts, bilateral      " Dyspepsia 10/18/2018     Elevated blood pressure reading without diagnosis of hypertension 10/18/2018     History of skin cancer 10/18/2018     Hyperlipidemia LDL goal <160 11/5/2018     Midline low back pain without sciatica 10/19/2018     Midline thoracic back pain, unspecified chronicity 10/18/2018     Mobitz I 8/12/2019     Primary osteoarthritis of ankle, unspecified laterality 11/9/2018     Seborrheic dermatitis 10/18/2018     Syncope 7/1/2019     Systolic hypertension 7/1/2019             Past Surgical History:   I have reviewed this patient's past surgical history  Past Surgical History:   Procedure Laterality Date     ARTHROSCOPY KNEE Right      ARTHROSCOPY KNEE WITH MENISCECTOMY Left     open               Social History:   I have reviewed this patient's social history  Social History     Tobacco Use     Smoking status: Former Smoker     Packs/day: 0.00     Smokeless tobacco: Never Used   Substance Use Topics     Alcohol use: Yes     Comment: 2 daily             Family History:   I have reviewed this patient's family history  History reviewed. No pertinent family history.          Allergies:   No Known Allergies          Medications:   I have reviewed this patient's current medications  Current Outpatient Medications   Medication Sig Dispense Refill     fish oil-omega-3 fatty acids 1000 MG capsule Take 2 g by mouth daily       rosuvastatin (CRESTOR) 20 MG tablet Take 1 tablet (20 mg) by mouth daily 90 tablet 3     nabumetone (RELAFEN) 750 MG tablet Take 1 tablet (750 mg) by mouth daily (Patient not taking: Reported on 4/17/2019) 90 tablet 0               Review of Systems:     Review of Systems:  Skin:  Negative for     Eyes:  Positive for    ENT:  Negative for    Respiratory:       Cardiovascular:  Negative    Gastroenterology: Positive for heartburn  Genitourinary:  not assessed    Musculoskeletal:  Positive for back pain  Neurologic:  Negative for    Psychiatric:  Positive for sleep  disturbances  Heme/Lymph/Imm:  Positive for hay fever  Endocrine:  Negative for                       Data:   All laboratory data reviewed  Lab Results   Component Value Date    CHOL 205 10/18/2018     Lab Results   Component Value Date    HDL 84 10/18/2018     Lab Results   Component Value Date     10/18/2018     Lab Results   Component Value Date    TRIG 99 10/18/2018     No results found for: CHOLHDLRATIO  TSH   Date Value Ref Range Status   10/18/2018 1.43 0.40 - 4.00 mU/L Final     Last Basic Metabolic Panel:  Lab Results   Component Value Date     10/18/2018      Lab Results   Component Value Date    POTASSIUM 3.8 10/18/2018     Lab Results   Component Value Date    CHLORIDE 105 10/18/2018     Lab Results   Component Value Date    YVETTE 9.2 10/18/2018     Lab Results   Component Value Date    CO2 29 10/18/2018     Lab Results   Component Value Date    BUN 12 10/18/2018     Lab Results   Component Value Date    CR 0.86 10/18/2018     Lab Results   Component Value Date     10/18/2018     Lab Results   Component Value Date    WBC 9.3 07/01/2019     Lab Results   Component Value Date    RBC 4.77 07/01/2019     Lab Results   Component Value Date    HGB 14.8 07/01/2019     Lab Results   Component Value Date    HCT 44.7 07/01/2019     Lab Results   Component Value Date    MCV 94 07/01/2019     Lab Results   Component Value Date    MCH 31.0 07/01/2019     Lab Results   Component Value Date    MCHC 33.1 07/01/2019     Lab Results   Component Value Date    RDW 13.1 07/01/2019     Lab Results   Component Value Date     07/01/2019     Thank you for allowing me to participate in the care of your patient.    Sincerely,     John Marin MD     [facility]

## 2019-10-02 NOTE — PROGRESS NOTES
Cardiology Consultation      Ignacio Sarmiento MRN# [id number]   YOB: 1950 Age: 69 year old   Date of Visit 10/02/2019     Reason for consult:  Presyncope/syncope           Assessment and Plan:     1. Presyncope/syncope possibly related to inappropriate autonomic response, provoked by dehydration    We discussed that his echocardiogram is structurally normal.  He does have some intermittent second degree type I AV block that may be contributing, but his symptoms have overall improved/resolved.    At this point, if he has recurrent symptoms or worsening symptoms would pursue further monitoring and consider electronic pacemaker.  We discussed that this is possible within the next 5 to 10 years with the natural history of conduction system aging.    Will do stress echocardiogram to rule out ischemic SA/AV node or exercise-induced/rate related heart block.      If stress testing is negative, patient may follow-up with his primary physician unless symptoms worsen.      2. Hyperlipidemia, dry mouth on atorvastatin    Trial of rosuvastatin 20 mg instead which is equivalent dosing to his atorvastatin 40 mg.    Patient may have this refilled by his primary clinician if tolerated.    Patient may follow-up with his primary clinician unless symptoms escalate.    This note was transcribed using electronic voice recognition software, typographical errors may be present.                Chief Complaint:   Consult (fu  Syncope, and SVT x3 with AV Block noted on Ziopatch, HTN)           History of Present Illness:   This patient is a 69 year old male accompanied by his wife.  He had an episode in July where it was very hot outside and he was sweating profusely when he felt weak and dizzy when pushing a cart and went down without loss of consciousness or loss of bowel/bladder.  He fell down and broke her rib at that time.  He was even sweatier when he recovered.  He noted some episodes of lightheadedness when  "standing up in the middle of the night leading up to that episode.  Currently he does not have any lightheadedness.    He had a 1 week ZIO monitor with his primary physician that showed asymptomatic SVT as well as second-degree type I AV block when sleeping at night or napping in the afternoon.    He is a runner and has been back to running 10-minute miles.  He denies any exertional chest discomfort or dyspnea on exertion.    He is currently on atorvastatin and thinks this is causing him significant dry mouth.         Physical Exam:     Vitals: /78   Pulse 78   Ht 1.765 m (5' 9.5\")   Wt 80.7 kg (178 lb)   BMI 25.91 kg/m    Constitutional:  cooperative, alert and oriented, well developed, well nourished, in no acute distress        Skin:  warm and dry to the touch, no apparent skin lesions or masses noted        Head:  normocephalic, no masses or lesions        Eyes:  pupils equal and round, conjunctivae and lids unremarkable, sclera white, no xanthalasma, EOMS intact, no nystagmus        ENT:  no pallor or cyanosis, dentition good        Neck:  carotid pulses are full and equal bilaterally, JVP normal, no carotid bruit        Chest:  normal breath sounds, clear to auscultation, normal A-P diameter, normal symmetry, normal respiratory excursion, no use of accessory muscles        Cardiac: regular rhythm bradycardic     grade 1;early systolic murmur;RUSB          Abdomen:      benign    Extremities and Back:  no deformities, clubbing, cyanosis, erythema observed;no edema        Neurological:  no gross motor deficits                    Past Medical History:   I have reviewed this patient's past medical history  Past Medical History:   Diagnosis Date     Cataracts, bilateral      Dyspepsia 10/18/2018     Elevated blood pressure reading without diagnosis of hypertension 10/18/2018     History of skin cancer 10/18/2018     Hyperlipidemia LDL goal <160 11/5/2018     Midline low back pain without sciatica " 10/19/2018     Midline thoracic back pain, unspecified chronicity 10/18/2018     Mobitz I 8/12/2019     Primary osteoarthritis of ankle, unspecified laterality 11/9/2018     Seborrheic dermatitis 10/18/2018     Syncope 7/1/2019     Systolic hypertension 7/1/2019             Past Surgical History:   I have reviewed this patient's past surgical history  Past Surgical History:   Procedure Laterality Date     ARTHROSCOPY KNEE Right      ARTHROSCOPY KNEE WITH MENISCECTOMY Left     open               Social History:   I have reviewed this patient's social history  Social History     Tobacco Use     Smoking status: Former Smoker     Packs/day: 0.00     Smokeless tobacco: Never Used   Substance Use Topics     Alcohol use: Yes     Comment: 2 daily             Family History:   I have reviewed this patient's family history  History reviewed. No pertinent family history.          Allergies:   No Known Allergies          Medications:   I have reviewed this patient's current medications  Current Outpatient Medications   Medication Sig Dispense Refill     fish oil-omega-3 fatty acids 1000 MG capsule Take 2 g by mouth daily       rosuvastatin (CRESTOR) 20 MG tablet Take 1 tablet (20 mg) by mouth daily 90 tablet 3     nabumetone (RELAFEN) 750 MG tablet Take 1 tablet (750 mg) by mouth daily (Patient not taking: Reported on 4/17/2019) 90 tablet 0               Review of Systems:     Review of Systems:  Skin:  Negative for     Eyes:  Positive for    ENT:  Negative for    Respiratory:       Cardiovascular:  Negative    Gastroenterology: Positive for heartburn  Genitourinary:  not assessed    Musculoskeletal:  Positive for back pain  Neurologic:  Negative for    Psychiatric:  Positive for sleep disturbances  Heme/Lymph/Imm:  Positive for hay fever  Endocrine:  Negative for                       Data:   All laboratory data reviewed  Lab Results   Component Value Date    CHOL 205 10/18/2018     Lab Results   Component Value Date    HDL 84  10/18/2018     Lab Results   Component Value Date     10/18/2018     Lab Results   Component Value Date    TRIG 99 10/18/2018     No results found for: CHOLHDLRATIO  TSH   Date Value Ref Range Status   10/18/2018 1.43 0.40 - 4.00 mU/L Final     Last Basic Metabolic Panel:  Lab Results   Component Value Date     10/18/2018      Lab Results   Component Value Date    POTASSIUM 3.8 10/18/2018     Lab Results   Component Value Date    CHLORIDE 105 10/18/2018     Lab Results   Component Value Date    YVETTE 9.2 10/18/2018     Lab Results   Component Value Date    CO2 29 10/18/2018     Lab Results   Component Value Date    BUN 12 10/18/2018     Lab Results   Component Value Date    CR 0.86 10/18/2018     Lab Results   Component Value Date     10/18/2018     Lab Results   Component Value Date    WBC 9.3 07/01/2019     Lab Results   Component Value Date    RBC 4.77 07/01/2019     Lab Results   Component Value Date    HGB 14.8 07/01/2019     Lab Results   Component Value Date    HCT 44.7 07/01/2019     Lab Results   Component Value Date    MCV 94 07/01/2019     Lab Results   Component Value Date    MCH 31.0 07/01/2019     Lab Results   Component Value Date    MCHC 33.1 07/01/2019     Lab Results   Component Value Date    RDW 13.1 07/01/2019     Lab Results   Component Value Date     07/01/2019

## 2019-10-03 ENCOUNTER — HOSPITAL ENCOUNTER (OUTPATIENT)
Dept: CARDIOLOGY | Facility: CLINIC | Age: 69
Discharge: HOME | End: 2019-10-03
Attending: INTERNAL MEDICINE | Admitting: INTERNAL MEDICINE
Payer: COMMERCIAL

## 2019-10-03 DIAGNOSIS — R55 SYNCOPE, UNSPECIFIED SYNCOPE TYPE: ICD-10-CM

## 2019-10-03 DIAGNOSIS — I44.1 MOBITZ TYPE 1 SECOND DEGREE AV BLOCK: ICD-10-CM

## 2019-10-03 PROCEDURE — 93350 STRESS TTE ONLY: CPT | Mod: 26 | Performed by: INTERNAL MEDICINE

## 2019-10-03 PROCEDURE — 93016 CV STRESS TEST SUPVJ ONLY: CPT | Performed by: INTERNAL MEDICINE

## 2019-10-03 PROCEDURE — 93321 DOPPLER ECHO F-UP/LMTD STD: CPT | Mod: 26 | Performed by: INTERNAL MEDICINE

## 2019-10-03 PROCEDURE — 93018 CV STRESS TEST I&R ONLY: CPT | Performed by: INTERNAL MEDICINE

## 2019-10-03 PROCEDURE — 93350 STRESS TTE ONLY: CPT | Mod: TC

## 2019-10-03 PROCEDURE — 93325 DOPPLER ECHO COLOR FLOW MAPG: CPT | Mod: 26 | Performed by: INTERNAL MEDICINE

## 2019-10-04 ENCOUNTER — HEALTH MAINTENANCE LETTER (OUTPATIENT)
Age: 69
End: 2019-10-04

## 2019-12-19 ENCOUNTER — TELEPHONE (OUTPATIENT)
Dept: FAMILY MEDICINE | Facility: CLINIC | Age: 69
End: 2019-12-19

## 2019-12-19 DIAGNOSIS — Z12.11 COLON CANCER SCREENING: Primary | ICD-10-CM

## 2019-12-19 NOTE — TELEPHONE ENCOUNTER
Type of outreach:  Phone, spoke to patient.  he will stop by clinic to  FIT test.  FIT test has been ordered.  Shantel Emmanuel, CMA

## 2019-12-19 NOTE — TELEPHONE ENCOUNTER
Summary:    Patient is due/failing the following:   FIT    Reviewed:  [x] CARE EVERYWHERE  [x] LAST OV NOTE INCLUDING ENDO  [x] FYI TAB  [x] LAST PANEL ENCOUNTER  [x] FUTURE APTS  [x] MYCHART STATUS   [x] IMMUNIZATIONS  Action needed:   Patient needs referral/order: FIT    Type of outreach:    Phone, left message for patient to call back.                                                                                Marine PHILLIPA

## 2019-12-31 DIAGNOSIS — Z12.11 COLON CANCER SCREENING: ICD-10-CM

## 2019-12-31 LAB — HEMOCCULT STL QL IA: NEGATIVE

## 2019-12-31 PROCEDURE — 82274 ASSAY TEST FOR BLOOD FECAL: CPT | Performed by: FAMILY MEDICINE

## 2020-02-10 ENCOUNTER — HEALTH MAINTENANCE LETTER (OUTPATIENT)
Age: 70
End: 2020-02-10

## 2020-08-21 ENCOUNTER — TRANSFERRED RECORDS (OUTPATIENT)
Dept: HEALTH INFORMATION MANAGEMENT | Facility: CLINIC | Age: 70
End: 2020-08-21

## 2020-09-29 ENCOUNTER — TRANSFERRED RECORDS (OUTPATIENT)
Dept: HEALTH INFORMATION MANAGEMENT | Facility: CLINIC | Age: 70
End: 2020-09-29

## 2020-10-06 ENCOUNTER — TRANSFERRED RECORDS (OUTPATIENT)
Dept: HEALTH INFORMATION MANAGEMENT | Facility: CLINIC | Age: 70
End: 2020-10-06

## 2020-12-02 ENCOUNTER — OFFICE VISIT (OUTPATIENT)
Dept: FAMILY MEDICINE | Facility: CLINIC | Age: 70
End: 2020-12-02
Payer: COMMERCIAL

## 2020-12-02 VITALS
DIASTOLIC BLOOD PRESSURE: 72 MMHG | BODY MASS INDEX: 24.78 KG/M2 | WEIGHT: 177 LBS | HEART RATE: 62 BPM | TEMPERATURE: 97.4 F | SYSTOLIC BLOOD PRESSURE: 142 MMHG | HEIGHT: 71 IN | OXYGEN SATURATION: 100 %

## 2020-12-02 DIAGNOSIS — Z11.59 ENCOUNTER FOR HEPATITIS C SCREENING TEST FOR LOW RISK PATIENT: ICD-10-CM

## 2020-12-02 DIAGNOSIS — Z00.00 MEDICARE ANNUAL WELLNESS VISIT, SUBSEQUENT: Primary | ICD-10-CM

## 2020-12-02 DIAGNOSIS — Z12.5 SCREENING FOR PROSTATE CANCER: ICD-10-CM

## 2020-12-02 DIAGNOSIS — Z12.11 SPECIAL SCREENING FOR MALIGNANT NEOPLASMS, COLON: ICD-10-CM

## 2020-12-02 PROCEDURE — 99397 PER PM REEVAL EST PAT 65+ YR: CPT | Performed by: FAMILY MEDICINE

## 2020-12-02 ASSESSMENT — ENCOUNTER SYMPTOMS
HEMATURIA: 0
COUGH: 0
DIZZINESS: 0
HEARTBURN: 0
FEVER: 0
ABDOMINAL PAIN: 0
WEAKNESS: 0
SHORTNESS OF BREATH: 0
DIARRHEA: 0
EYE PAIN: 0
SORE THROAT: 0
CHILLS: 0
NAUSEA: 0
JOINT SWELLING: 0
NERVOUS/ANXIOUS: 0
MYALGIAS: 0
FREQUENCY: 0
HEADACHES: 0
DYSURIA: 0
PARESTHESIAS: 0
ARTHRALGIAS: 1
HEMATOCHEZIA: 0
PALPITATIONS: 0
CONSTIPATION: 0

## 2020-12-02 ASSESSMENT — MIFFLIN-ST. JEOR: SCORE: 1582.06

## 2020-12-02 ASSESSMENT — ACTIVITIES OF DAILY LIVING (ADL): CURRENT_FUNCTION: NO ASSISTANCE NEEDED

## 2020-12-02 NOTE — PROGRESS NOTES
"SUBJECTIVE:   Igancio Sarmiento is a 70 year old male who presents for Preventive Visit.      Patient has been advised of split billing requirements and indicates understanding: Yes   Are you in the first 12 months of your Medicare coverage?  No    Healthy Habits:     In general, how would you rate your overall health?  Good    Frequency of exercise:  4-5 days/week    Duration of exercise:  45-60 minutes    Do you usually eat at least 4 servings of fruit and vegetables a day, include whole grains    & fiber and avoid regularly eating high fat or \"junk\" foods?  Yes    Taking medications regularly:  Yes    Medication side effects:  None    Ability to successfully perform activities of daily living:  No assistance needed    Home Safety:  No safety concerns identified    Hearing Impairment:  No hearing concerns    In the past 6 months, have you been bothered by leaking of urine?  No    In general, how would you rate your overall mental or emotional health?  Good      PHQ-2 Total Score: 0    Additional concerns today:  Yes    Do you feel safe in your environment? Yes    Have you ever done Advance Care Planning? (For example, a Health Directive, POLST, or a discussion with a medical provider or your loved ones about your wishes): No, advance care planning information given to patient to review.  Patient plans to discuss their wishes with loved ones or provider.        Fall risk  Fallen 2 or more times in the past year?: No  Any fall with injury in the past year?: No    Cognitive Screening   1) Repeat 3 items (Leader, Season, Table)    2) Clock draw: NORMAL  3) 3 item recall: Recalls 2 objects   Results: NORMAL clock, 1-2 items recalled: COGNITIVE IMPAIRMENT LESS LIKELY    Mini-CogTM Copyright GALILEO Ferreira. Licensed by the author for use in [facility]; reprinted with permission (kiera@.[facility]). All rights reserved.      Do you have sleep apnea, excessive snoring or daytime drowsiness?: no    Reviewed and " updated as needed this visit by clinical staff  Tobacco  Allergies    Med Hx  Surg Hx  Fam Hx  Soc Hx        Reviewed and updated as needed this visit by Provider                Social History     Tobacco Use     Smoking status: Former Smoker     Packs/day: 0.00     Smokeless tobacco: Never Used   Substance Use Topics     Alcohol use: Yes     Comment: 2 daily         Alcohol Use 12/2/2020   Prescreen: >3 drinks/day or >7 drinks/week? Yes   AUDIT SCORE  8               Current providers sharing in care for this patient include:   Patient Care Team:  Julio Cesar Chávez MD as PCP - General (Family Practice)  Julio Cesar Chávez MD as Assigned PCP  John Marin MD as Assigned Heart and Vascular Provider    The following health maintenance items are reviewed in Epic and correct as of today:  Health Maintenance   Topic Date Due     PSA  1950     ANNUAL REVIEW OF HM ORDERS  1950     HEPATITIS C SCREENING  07/18/1969     ZOSTER IMMUNIZATION (1 of 2) 07/18/2001     AORTIC ANEURYSM SCREENING (SYSTEM ASSIGNED)  07/18/2016     Pneumococcal Vaccine: 65+ Years (2 of 2 - PPSV23) 02/28/2019     FALL RISK ASSESSMENT  07/01/2020     COLORECTAL CANCER SCREENING  12/31/2020     MEDICARE ANNUAL WELLNESS VISIT  12/02/2021     LIPID  10/18/2023     ADVANCE CARE PLANNING  12/02/2025     DTAP/TDAP/TD IMMUNIZATION (2 - Td) 04/17/2029     PHQ-2  Completed     INFLUENZA VACCINE  Completed     Pneumococcal Vaccine: Pediatrics (0 to 5 Years) and At-Risk Patients (6 to 64 Years)  Aged Out     IPV IMMUNIZATION  Aged Out     MENINGITIS IMMUNIZATION  Aged Out     HEPATITIS B IMMUNIZATION  Aged Out     BP Readings from Last 3 Encounters:   12/02/20 (!) 160/80   10/02/19 130/78   07/01/19 178/81    Wt Readings from Last 3 Encounters:   12/02/20 80.3 kg (177 lb)   10/02/19 80.7 kg (178 lb)   07/01/19 81.2 kg (179 lb)            Current Outpatient Medications   Medication     fish oil-omega-3 fatty acids 1000 MG capsule     nabumetone  "(RELAFEN) 750 MG tablet     rosuvastatin (CRESTOR) 20 MG tablet     No current facility-administered medications for this visit.                  Review of Systems   Constitutional: Negative for chills and fever.   HENT: Negative for congestion, ear pain, hearing loss and sore throat.    Eyes: Negative for pain and visual disturbance.   Respiratory: Negative for cough and shortness of breath.    Cardiovascular: Negative for chest pain, palpitations and peripheral edema.   Gastrointestinal: Negative for abdominal pain, constipation, diarrhea, heartburn, hematochezia and nausea.   Genitourinary: Negative for discharge, dysuria, frequency, genital sores, hematuria, impotence and urgency.   Musculoskeletal: Positive for arthralgias. Negative for joint swelling and myalgias.   Skin: Negative for rash.   Neurological: Negative for dizziness, weakness, headaches and paresthesias.   Psychiatric/Behavioral: Negative for mood changes. The patient is not nervous/anxious.      Constitutional, HEENT, cardiovascular, pulmonary, GI, , musculoskeletal, neuro, skin, endocrine and psych systems are negative, except as otherwise noted.    OBJECTIVE:   BP (!) 160/80   Pulse 62   Temp 97.4  F (36.3  C) (Oral)   Ht 1.791 m (5' 10.5\")   Wt 80.3 kg (177 lb)   SpO2 100%   BMI 25.04 kg/m   Estimated body mass index is 25.04 kg/m  as calculated from the following:    Height as of this encounter: 1.791 m (5' 10.5\").    Weight as of this encounter: 80.3 kg (177 lb).  Physical Exam  GENERAL: healthy, alert and no distress  EYES: Eyes grossly normal to inspection, PERRL and conjunctivae and sclerae normal  HENT: ear canals and TM's normal, nose and mouth without ulcers or lesions  NECK: no adenopathy, no asymmetry, masses, or scars and thyroid normal to palpation  RESP: lungs clear to auscultation - no rales, rhonchi or wheezes  CV: regular rate and rhythm, normal S1 S2, no S3 or S4, no murmur, click or rub, no peripheral edema and " "peripheral pulses strong  ABDOMEN: soft, nontender, no hepatosplenomegaly, no masses and bowel sounds normal  MS: no gross musculoskeletal defects noted, no edema  SKIN: no suspicious lesions or rashes  NEURO: Normal strength and tone, mentation intact and speech normal  PSYCH: mentation appears normal, affect normal/bright    Diagnostic Test Results:  Labs reviewed in Epic    ASSESSMENT / PLAN:   (Z00.00) Medicare annual wellness visit, subsequent  (primary encounter diagnosis)  Comment:   Plan: Lipid panel reflex to direct LDL Fasting,         **Comprehensive metabolic panel FUTURE anytime,        Fecal colorectal cancer screen (FIT), PSA,         screen            (Z12.5) Screening for prostate cancer  Comment:   Plan: PSA, screen        No fh    (Z12.11) Special screening for malignant neoplasms, colon  Comment:   Plan: Fecal colorectal cancer screen (FIT)        Discussed colonoscopy    (Z11.59) Encounter for hepatitis C screening test for low risk patient  Comment:   Plan: **Hepatitis C Screen Reflex to RNA FUTURE         anytime                Patient has been advised of split billing requirements and indicates understanding:   COUNSELING:  Reviewed preventive health counseling, as reflected in patient instructions       Regular exercise       Healthy diet/nutrition       Vision screening       Hepatitis C screening    Estimated body mass index is 25.04 kg/m  as calculated from the following:    Height as of this encounter: 1.791 m (5' 10.5\").    Weight as of this encounter: 80.3 kg (177 lb).        He reports that he has quit smoking. He smoked 0.00 packs per day. He has never used smokeless tobacco.      Appropriate preventive services were discussed with this patient, including applicable screening as appropriate for cardiovascular disease, diabetes, osteopenia/osteoporosis, and glaucoma.  As appropriate for age/gender, discussed screening for colorectal cancer, prostate cancer, breast cancer, and cervical " cancer. Checklist reviewing preventive services available has been given to the patient.    Reviewed patients plan of care and provided an AVS. The Basic Care Plan (routine screening as documented in Health Maintenance) for Ignacio meets the Care Plan requirement. This Care Plan has been established and reviewed with the Patient.    Counseling Resources:  ATP IV Guidelines  Pooled Cohorts Equation Calculator  Breast Cancer Risk Calculator  Breast Cancer: Medication to Reduce Risk  FRAX Risk Assessment  ICSI Preventive Guidelines  Dietary Guidelines for Americans, 2010  USDA's MyPlate  ASA Prophylaxis  Lung CA Screening    Brayan Cobb MD  [facility]    Identified Health Risks:

## 2020-12-14 ENCOUNTER — DOCUMENTATION ONLY (OUTPATIENT)
Dept: LAB | Facility: CLINIC | Age: 70
End: 2020-12-14

## 2020-12-14 NOTE — PROGRESS NOTES
..Ignacio Sarmiento has an upcoming lab appointment:    Future Appointments   Date Time Provider Department Center   12/17/2020  9:15 AM CR LAB CRLAB CR      Please review Health Maintenance and sign order: Review of Health Maintenance Protocol Orders (HMPO) to authorize patient's due Health Maintenance labs to be drawn.    Health Maintenance Due   Topic     PSA      ANNUAL REVIEW OF HM ORDERS      HEPATITIS C SCREENING      Leo Rondon

## 2020-12-17 DIAGNOSIS — Z11.59 ENCOUNTER FOR HEPATITIS C SCREENING TEST FOR LOW RISK PATIENT: ICD-10-CM

## 2020-12-17 DIAGNOSIS — Z12.5 SCREENING FOR PROSTATE CANCER: ICD-10-CM

## 2020-12-17 DIAGNOSIS — Z00.00 MEDICARE ANNUAL WELLNESS VISIT, SUBSEQUENT: ICD-10-CM

## 2020-12-17 LAB
ALBUMIN SERPL-MCNC: 3.8 G/DL (ref 3.4–5)
ALP SERPL-CCNC: 70 U/L (ref 40–150)
ALT SERPL W P-5'-P-CCNC: 25 U/L (ref 0–70)
ANION GAP SERPL CALCULATED.3IONS-SCNC: 1 MMOL/L (ref 3–14)
AST SERPL W P-5'-P-CCNC: 21 U/L (ref 0–45)
BILIRUB SERPL-MCNC: 1.1 MG/DL (ref 0.2–1.3)
BUN SERPL-MCNC: 13 MG/DL (ref 7–30)
CALCIUM SERPL-MCNC: 8.9 MG/DL (ref 8.5–10.1)
CHLORIDE SERPL-SCNC: 104 MMOL/L (ref 94–109)
CHOLEST SERPL-MCNC: 201 MG/DL
CO2 SERPL-SCNC: 32 MMOL/L (ref 20–32)
CREAT SERPL-MCNC: 0.7 MG/DL (ref 0.66–1.25)
GFR SERPL CREATININE-BSD FRML MDRD: >90 ML/MIN/{1.73_M2}
GLUCOSE SERPL-MCNC: 109 MG/DL (ref 70–99)
HCV AB SERPL QL IA: NONREACTIVE
HDLC SERPL-MCNC: 81 MG/DL
LDLC SERPL CALC-MCNC: 105 MG/DL
NONHDLC SERPL-MCNC: 120 MG/DL
POTASSIUM SERPL-SCNC: 3.9 MMOL/L (ref 3.4–5.3)
PROT SERPL-MCNC: 7.1 G/DL (ref 6.8–8.8)
SODIUM SERPL-SCNC: 137 MMOL/L (ref 133–144)
TRIGL SERPL-MCNC: 75 MG/DL

## 2020-12-17 PROCEDURE — 80061 LIPID PANEL: CPT | Performed by: FAMILY MEDICINE

## 2020-12-17 PROCEDURE — 36415 COLL VENOUS BLD VENIPUNCTURE: CPT | Performed by: FAMILY MEDICINE

## 2020-12-17 PROCEDURE — 86803 HEPATITIS C AB TEST: CPT | Performed by: FAMILY MEDICINE

## 2020-12-17 PROCEDURE — 80053 COMPREHEN METABOLIC PANEL: CPT | Performed by: FAMILY MEDICINE

## 2020-12-17 PROCEDURE — G0103 PSA SCREENING: HCPCS | Performed by: FAMILY MEDICINE

## 2020-12-18 LAB — PSA SERPL-ACNC: 1.53 UG/L (ref 0–4)

## 2020-12-21 DIAGNOSIS — Z00.00 MEDICARE ANNUAL WELLNESS VISIT, SUBSEQUENT: ICD-10-CM

## 2020-12-21 DIAGNOSIS — Z12.11 SPECIAL SCREENING FOR MALIGNANT NEOPLASMS, COLON: ICD-10-CM

## 2020-12-21 PROCEDURE — 82274 ASSAY TEST FOR BLOOD FECAL: CPT | Performed by: FAMILY MEDICINE

## 2020-12-23 LAB — HEMOCCULT STL QL IA: NEGATIVE

## 2021-01-13 ENCOUNTER — ANCILLARY PROCEDURE (OUTPATIENT)
Dept: GENERAL RADIOLOGY | Facility: CLINIC | Age: 71
End: 2021-01-13
Attending: FAMILY MEDICINE
Payer: COMMERCIAL

## 2021-01-13 ENCOUNTER — OFFICE VISIT (OUTPATIENT)
Dept: URGENT CARE | Facility: URGENT CARE | Age: 71
End: 2021-01-13
Payer: COMMERCIAL

## 2021-01-13 VITALS
SYSTOLIC BLOOD PRESSURE: 138 MMHG | RESPIRATION RATE: 20 BRPM | HEART RATE: 66 BPM | OXYGEN SATURATION: 98 % | DIASTOLIC BLOOD PRESSURE: 78 MMHG | TEMPERATURE: 98 F

## 2021-01-13 DIAGNOSIS — S91.111A LACERATION OF RIGHT GREAT TOE WITHOUT FOREIGN BODY PRESENT OR DAMAGE TO NAIL, INITIAL ENCOUNTER: ICD-10-CM

## 2021-01-13 DIAGNOSIS — S99.921A TOE INJURY, RIGHT, INITIAL ENCOUNTER: Primary | ICD-10-CM

## 2021-01-13 PROCEDURE — 12001 RPR S/N/AX/GEN/TRNK 2.5CM/<: CPT | Performed by: FAMILY MEDICINE

## 2021-01-13 PROCEDURE — 99214 OFFICE O/P EST MOD 30 MIN: CPT | Mod: 25 | Performed by: FAMILY MEDICINE

## 2021-01-13 PROCEDURE — 73660 X-RAY EXAM OF TOE(S): CPT | Mod: RT | Performed by: RADIOLOGY

## 2021-01-13 NOTE — PATIENT INSTRUCTIONS
Keep dressing clean and dry for 24 hours  Allow glue to peel off on its own  No antibiotic ointment to wound  Monitor for wound infection    Okay for tylenol and ibuprofen for discomfort      Patient Education     Extremity Laceration: Skin Glue  A laceration is a cut through the skin. You have a laceration that has been closed with skin glue. This is used on cuts that have smooth edges and are not infected. It's best used on straight, clean cuts on areas that do not get a lot of tension.  You may need a tetanus shot. This is given if you have no record of a shot, and the object that caused the cut may lead to tetanus.  Home care    Your healthcare provider may prescribe an antibiotic. This is to help prevent infection. Follow all instructions for taking this medicine. Take the medicine every day until it is gone or you are told to stop. You should not have any left over.    The healthcare provider may prescribe medicines for pain. Follow instructions for taking them.    Follow the healthcare provider s instructions on how to care for the cut.    No bandage is needed. Skin glue peels off on its own within 5 to 10 days. Most skin wounds heal within 10 days.    Keep the wound clean. You may shower or bathe as usual, but do not use soaps, lotions, or ointments on the wound area. Do not scrub the wound. After bathing, pat the wound dry with a soft towel.    Don't scratch, rub, or pick at the film. Don't place tape directly over the film.    Don't put liquids such as peroxide, ointments, or creams on the wound while the skin glue is in place. Many oil based products can weaken and dissolve the glue.    Don't do any activities that may reinjure your wound.    Don't do any activities that cause heavy sweating. Protect the wound from sunlight.    Most skin wounds heal without problems. But an infection sometimes occurs even with proper treatment. Watch for the signs of infection listed below.  Follow-up care  Follow up as  directed with your healthcare provider, or as advised.  When to seek medical advice  Call your healthcare provider right away if any of these occur:    Wound bleeding not controlled by direct pressure    Signs of infection, including increasing pain in the wound, increasing wound redness or swelling, or pus or bad odor coming from the wound    Fever of 100.4 F (38. C) or higher, or as directed by your healthcare provider    Wound edges reopen    Wound changes colors    Numbness around the wound     Decreased movement around the injured area  [employer] last reviewed this educational content on 7/1/2017 2000-2020 [employer]. [address] [place], [place] [address]. All rights reserved. This information is not intended as a substitute for professional medical care. Always follow your healthcare professional's instructions.

## 2021-01-13 NOTE — PROGRESS NOTES
SUBJECTIVE:  Chief Complaint   Patient presents with     Urgent Care     Toe Injury     R toe pain      Ignacio Sarmiento is a 70 year old male presents with a chief complaint of right toe pain.  The injury occurred 1 hour(s) ago (12:15pm).   The injury happened while at family's home. How: paint can fell and hit him on his right big toe, was not wearing shoe.  Noticed bleeding afterwards.  The patient complained of mild pain  and has not had decreased ROM.  Pain exacerbated by movement.  He treated it initially with dressing. This is the first time this type of injury has occurred to this patient.     Tdap 2019    Past Medical History:   Diagnosis Date     Cataracts, bilateral      Dyspepsia 10/18/2018     Elevated blood pressure reading without diagnosis of hypertension 10/18/2018     History of skin cancer 10/18/2018     Hyperlipidemia LDL goal <160 11/5/2018     Midline low back pain without sciatica 10/19/2018     Midline thoracic back pain, unspecified chronicity 10/18/2018     Mobitz I 8/12/2019     Primary osteoarthritis of ankle, unspecified laterality 11/9/2018     Seborrheic dermatitis 10/18/2018     Syncope 7/1/2019     Systolic hypertension 7/1/2019     No current outpatient medications on file.     Social History     Tobacco Use     Smoking status: Former Smoker     Packs/day: 0.00     Smokeless tobacco: Never Used   Substance Use Topics     Alcohol use: Yes     Comment: 2 daily       ROS:  Review of systems negative except as stated above.    EXAM:   /78   Pulse 66   Temp 98  F (36.7  C) (Tympanic)   Resp 20   SpO2 98%   Gen: healthy,alert,no distress    Size of laceration: 2 centimeters  Characteristics of the laceration: clean and straight on dorsum of big toe  Tendon function intact: yes  Sensation to light touch intact: yes  Pulses intact: yes  Picture included in patient's chart: no    X-RAY was done - right big toe - no acute fracture personally viewed by me      Assessment:      Toe injury, right, initial encounter  Laceration of right great toe without foreign body present or damage to nail, initial encounter    PLAN:  PROCEDURE NOTE::  Wound cleaned with Shur-Clens  Wound soaked  Exo fin adhesive was applied    After care instructions:  Keep wound clean and dry for the next 24-48 hours  Signs of infection discussed today  Discussed the probability of scarring  Allow adhesive to peel off on its own  Okay for tylenol and ibuprofen for discomfort    Armin Fofana MD  January 13, 2021 2:41 PM

## 2021-02-16 ENCOUNTER — TELEPHONE (OUTPATIENT)
Dept: FAMILY MEDICINE | Facility: CLINIC | Age: 71
End: 2021-02-16

## 2021-02-16 NOTE — TELEPHONE ENCOUNTER
General Call:     Who is calling:  Ignacio    Reason for Call:  immunization    What are your questions or concerns:  I received a Flu Shot from [facility] in September 2020.   I received my first dose of COVID 19 vaccination at the [facility] on 2/6/2021. Pfizer, lot #[id number].   2nd dose scheduled for 2/27/2021           Date of last appointment with provider:     Okay to leave a detailed message:No at Other phone number:  *

## 2021-03-09 ENCOUNTER — HOSPITAL ENCOUNTER (EMERGENCY)
Facility: CLINIC | Age: 71
Discharge: HOME | End: 2021-03-10
Attending: EMERGENCY MEDICINE | Admitting: EMERGENCY MEDICINE
Payer: COMMERCIAL

## 2021-03-09 DIAGNOSIS — R42 DIZZINESS: ICD-10-CM

## 2021-03-09 LAB
ANION GAP SERPL CALCULATED.3IONS-SCNC: 4 MMOL/L (ref 3–14)
BASOPHILS # BLD AUTO: 0 10E9/L (ref 0–0.2)
BASOPHILS NFR BLD AUTO: 0.2 %
BUN SERPL-MCNC: 12 MG/DL (ref 7–30)
CALCIUM SERPL-MCNC: 9.6 MG/DL (ref 8.5–10.1)
CHLORIDE SERPL-SCNC: 104 MMOL/L (ref 94–109)
CO2 SERPL-SCNC: 31 MMOL/L (ref 20–32)
CREAT SERPL-MCNC: 0.7 MG/DL (ref 0.66–1.25)
DIFFERENTIAL METHOD BLD: ABNORMAL
EOSINOPHIL # BLD AUTO: 0 10E9/L (ref 0–0.7)
EOSINOPHIL NFR BLD AUTO: 0 %
ERYTHROCYTE [DISTWIDTH] IN BLOOD BY AUTOMATED COUNT: 12.8 % (ref 10–15)
GFR SERPL CREATININE-BSD FRML MDRD: >90 ML/MIN/{1.73_M2}
GLUCOSE SERPL-MCNC: 127 MG/DL (ref 70–99)
HCT VFR BLD AUTO: 44.3 % (ref 40–53)
HGB BLD-MCNC: 13.9 G/DL (ref 13.3–17.7)
IMM GRANULOCYTES # BLD: 0 10E9/L (ref 0–0.4)
IMM GRANULOCYTES NFR BLD: 0.2 %
LYMPHOCYTES # BLD AUTO: 0.7 10E9/L (ref 0.8–5.3)
LYMPHOCYTES NFR BLD AUTO: 8.1 %
MCH RBC QN AUTO: 30.3 PG (ref 26.5–33)
MCHC RBC AUTO-ENTMCNC: 31.4 G/DL (ref 31.5–36.5)
MCV RBC AUTO: 97 FL (ref 78–100)
MONOCYTES # BLD AUTO: 0.2 10E9/L (ref 0–1.3)
MONOCYTES NFR BLD AUTO: 2.6 %
NEUTROPHILS # BLD AUTO: 7.5 10E9/L (ref 1.6–8.3)
NEUTROPHILS NFR BLD AUTO: 88.9 %
NRBC # BLD AUTO: 0 10*3/UL
NRBC BLD AUTO-RTO: 0 /100
PLATELET # BLD AUTO: 272 10E9/L (ref 150–450)
POTASSIUM SERPL-SCNC: 4.2 MMOL/L (ref 3.4–5.3)
RBC # BLD AUTO: 4.58 10E12/L (ref 4.4–5.9)
SODIUM SERPL-SCNC: 139 MMOL/L (ref 133–144)
TROPONIN I SERPL-MCNC: <0.015 UG/L (ref 0–0.04)
WBC # BLD AUTO: 8.4 10E9/L (ref 4–11)

## 2021-03-09 PROCEDURE — 250N000011 HC RX IP 250 OP 636: Performed by: EMERGENCY MEDICINE

## 2021-03-09 PROCEDURE — 250N000013 HC RX MED GY IP 250 OP 250 PS 637: Performed by: EMERGENCY MEDICINE

## 2021-03-09 PROCEDURE — 99284 EMERGENCY DEPT VISIT MOD MDM: CPT | Mod: 25

## 2021-03-09 PROCEDURE — 93005 ELECTROCARDIOGRAM TRACING: CPT

## 2021-03-09 PROCEDURE — 80048 BASIC METABOLIC PNL TOTAL CA: CPT | Performed by: EMERGENCY MEDICINE

## 2021-03-09 PROCEDURE — 84484 ASSAY OF TROPONIN QUANT: CPT | Performed by: EMERGENCY MEDICINE

## 2021-03-09 PROCEDURE — 96374 THER/PROPH/DIAG INJ IV PUSH: CPT

## 2021-03-09 PROCEDURE — 85025 COMPLETE CBC W/AUTO DIFF WBC: CPT | Performed by: EMERGENCY MEDICINE

## 2021-03-09 RX ORDER — ONDANSETRON 2 MG/ML
4 INJECTION INTRAMUSCULAR; INTRAVENOUS ONCE
Status: DISCONTINUED | OUTPATIENT
Start: 2021-03-09 | End: 2021-03-10 | Stop reason: HOSPADM

## 2021-03-09 RX ORDER — MECLIZINE HYDROCHLORIDE 25 MG/1
25 TABLET ORAL ONCE
Status: DISCONTINUED | OUTPATIENT
Start: 2021-03-09 | End: 2021-03-10 | Stop reason: HOSPADM

## 2021-03-09 ASSESSMENT — ENCOUNTER SYMPTOMS
VOMITING: 1
ABDOMINAL PAIN: 0
DIZZINESS: 1
HEADACHES: 0
NAUSEA: 1
LIGHT-HEADEDNESS: 1

## 2021-03-10 VITALS
OXYGEN SATURATION: 97 % | TEMPERATURE: 97.7 F | HEART RATE: 57 BPM | DIASTOLIC BLOOD PRESSURE: 74 MMHG | RESPIRATION RATE: 9 BRPM | SYSTOLIC BLOOD PRESSURE: 139 MMHG

## 2021-03-10 LAB — INTERPRETATION ECG - MUSE: NORMAL

## 2021-03-10 RX ORDER — MECLIZINE HYDROCHLORIDE 25 MG/1
25 TABLET ORAL 3 TIMES DAILY PRN
Qty: 20 TABLET | Refills: 0 | Status: SHIPPED | OUTPATIENT
Start: 2021-03-10 | End: 2022-05-31

## 2021-03-10 RX ORDER — ONDANSETRON 4 MG/1
4 TABLET, ORALLY DISINTEGRATING ORAL EVERY 8 HOURS PRN
Qty: 10 TABLET | Refills: 0 | Status: SHIPPED | OUTPATIENT
Start: 2021-03-10 | End: 2021-03-13

## 2021-03-10 NOTE — ED TRIAGE NOTES
Here for intermittent lightheadedness started couple days ago that resolve on its own. Worsening tonight associated n/v. ABCs intact.

## 2021-03-10 NOTE — DISCHARGE INSTRUCTIONS
Your symptoms are mixed with more of a lightheaded symptom as well as dizziness and ear ringing.  We do not feel this is related to a central nervous system problem your dehydration lab work and EKG are normal.  Continue meclizine and as a nausea medication and follow-up with your regular doctor for reassessment return with severe headache double vision or worsening condition or if symptoms that are constant and unrelenting.

## 2021-03-10 NOTE — ED PROVIDER NOTES
History     Chief Complaint:  Dizziness      HPI  Ignacio Sarmiento is a 70 year old male with a history of systolic HTN and HLD who presents to the emergency department for evaluation of dizziness. Patient reports feeling intermittently lightheaded the past few days. This morning patient woke up complaining of dizziness that occurs when moving his head. At 3pm patient was working on his computer when his symptoms worsened and he began feeling nauseous and lightheaded but denies actually passing out. He notes he has not been eating as much as usual so an hour later he tried eating a protein shake but was unable to keep it down. He also notes having tinnitus for a while but this worsened today. He denies chest pain, headache, abdominal pain, and double vision. He does not take any daily medications.     Review of Systems   HENT: Positive for tinnitus.    Eyes: Negative for visual disturbance.   Cardiovascular: Negative for chest pain.   Gastrointestinal: Positive for nausea and vomiting. Negative for abdominal pain.   Neurological: Positive for dizziness and light-headedness. Negative for syncope and headaches.   All other systems reviewed and are negative.    Allergies:  No known drug allergies    Medications:    The patient is not currently taking any prescribed medications.    Past Medical History:    Bilateral cataracts  HLD  Mobitz I  Skin cancer  OA  Systolic HTN  Midline low back pain without sciatica  Dyspepsia    Past Surgical History:    Bilateral knee arthroscopy, left knee meniscectomy    Social History:  The patient presents to the emergency department alone.  Marital Status:     Physical Exam     Patient Vitals for the past 24 hrs:   BP Temp Temp src Pulse Resp SpO2   03/10/21 0045 139/74 -- -- 57 9 97 %   03/10/21 0030 -- -- -- 62 28 95 %   03/10/21 0015 -- -- -- 61 18 95 %   03/10/21 0000 -- -- -- 72 17 97 %   03/09/21 2345 -- -- -- 65 18 97 %   03/09/21 2330 -- -- -- -- -- 97 %    03/09/21 2315 -- -- -- -- -- 97 %   03/09/21 2300 -- -- -- -- -- 97 %   03/09/21 1915 (!) 182/96 97.7  F (36.5  C) Temporal 74 18 96 %         Physical Exam  Vitals signs reviewed.   HENT:      Head: Normocephalic.      Right Ear: Tympanic membrane normal.      Nose: Nose normal.      Mouth/Throat:      Mouth: Mucous membranes are moist.   Eyes:      Extraocular Movements: Extraocular movements intact.      Pupils: Pupils are equal, round, and reactive to light.   Neck:      Musculoskeletal: Normal range of motion.   Cardiovascular:      Rate and Rhythm: Normal rate and regular rhythm.   Pulmonary:      Effort: Pulmonary effort is normal.      Breath sounds: Normal breath sounds.   Skin:     General: Skin is warm.      Capillary Refill: Capillary refill takes less than 2 seconds.   Neurological:      General: No focal deficit present.      Mental Status: He is alert and oriented to person, place, and time. Mental status is at baseline.      Cranial Nerves: No cranial nerve deficit.      Motor: No weakness.      Gait: Gait normal.   Psychiatric:         Mood and Affect: Mood normal.           Emergency Department Course   ECG  ECG taken at 1943, ECG read at 1943  Normal sinus rhythm. Normal ECG.  No prior EKG.  Rate 62 bpm. NM interval 146 ms. QRS duration 90 ms. QT/QTc 424/430 ms. P-R-T axes 66 43 55.     Laboratory:  CBC: WBC: 8.4, HGB: 13.9, PLT: 272  BMP: Glucose 127 (H), o/w WNL (Creatinine: 0.70)  Troponin (Collected 2132): <0.015    Emergency Department Course:  Reviewed:  I reviewed the patient's nursing notes, vitals, past medical records, Care Everywhere.     Assessments:  2301 I assessed the patient. Exam findings described above.    2357 I reassessed and updated the patient.     0053 I reassessed the patient.    Interventions:  Zofran 4 mg IV  Meclizine 25 mg Oral    Disposition:  Discharged to home.    Impression & Plan    Medical Decision Making:  Patient presents with dizziness for a few days.   Symptoms sound at times more like a lightheaded or presyncopal dizziness.  EKG shows sinus bradycardia without concerns for QT or IA widening.  Lab work was ordered in triage and normal.  Included a troponin given the patient has had no chest pain or shortness of breath.  Clinical presentation is admits to mixed back bag of dehydration versus vertigo there is chronic tinnitus.  His neurological exam does not suggest central vertigo in origin.  Patient was offered antivertigo medications and follow-up with primary care return with worsening condition.    Diagnosis:    ICD-10-CM    1. Dizziness  R42        Discharge Medications:  New Prescriptions    MECLIZINE (ANTIVERT) 25 MG TABLET    Take 1 tablet (25 mg) by mouth 3 times daily as needed for dizziness    ONDANSETRON (ZOFRAN ODT) 4 MG ODT TAB    Take 1 tablet (4 mg) by mouth every 8 hours as needed for nausea or vomiting         John Hamilton  3/9/2021   EMERGENCY DEPARTMENT  Scribe Disclosure:  I, John Hamilton, am serving as a scribe at 11:57 PM on 3/9/2021 to document services personally performed by Julio Cesar Tomlinson MD based on my observations and the provider's statements to me.          Julio Cesar Tomlinson MD  03/10/21 0450

## 2021-03-11 ENCOUNTER — NURSE TRIAGE (OUTPATIENT)
Dept: FAMILY MEDICINE | Facility: CLINIC | Age: 71
End: 2021-03-11

## 2021-03-11 ENCOUNTER — VIRTUAL VISIT (OUTPATIENT)
Dept: FAMILY MEDICINE | Facility: CLINIC | Age: 71
End: 2021-03-11
Payer: COMMERCIAL

## 2021-03-11 DIAGNOSIS — H93.13 TINNITUS, BILATERAL: ICD-10-CM

## 2021-03-11 DIAGNOSIS — R42 DIZZINESS: Primary | ICD-10-CM

## 2021-03-11 PROCEDURE — 99213 OFFICE O/P EST LOW 20 MIN: CPT | Mod: 95 | Performed by: PHYSICIAN ASSISTANT

## 2021-03-11 NOTE — TELEPHONE ENCOUNTER
S-(situation): patient calling because he was in the ER on 3/9/2021 due to dizziness. He is still feeling dizziness and would like to have a referral placed to ENT if possible.     B-(background): ER visit on 3/9/2021 and hx of circulartoy issues.     A-(assessment): See below    R-(recommendations): Virtual Visit set up for this afternoon with provider.     Patient expressed understanding and acceptance of the plan and had no further questions at this time. Advised to call back if worsening symptoms or no improvement noted.     Additional Information    Negative: Chest pain    Negative: Rectal bleeding, bloody stool, or tarry-black stool    Negative: Vomiting is the main symptom    Negative: Diarrhea is the main symptom    Negative: Headache is the main symptom    Negative: Heat exhaustion suspected (i.e., dehydration from heat exposure)    Negative: Patient states that he/she is having an anxiety/panic attack    Negative: Shock suspected (e.g., cold/pale/clammy skin, too weak to stand, low BP, rapid pulse)    Negative: Difficult to awaken or acting confused (e.g., disoriented, slurred speech)    Negative: Fainted, and still feels dizzy afterwards    Negative: Severe difficulty breathing (e.g., struggling for each breath, speaks in single words)    Negative: Overdose (accidental or intentional) of medications    Negative: New neurologic deficit that is present now: * Weakness of the face, arm, or leg on one side of the body * Numbness of the face, arm, or leg on one side of the body * Loss of speech or garbled speech    Negative: Heart beating < 50 beats per minute OR > 140 beats per minute    Negative: Sounds like a life-threatening emergency to the triager    Negative: SEVERE dizziness (e.g., unable to stand, requires support to walk, feels like passing out now)    Negative: SEVERE headache or neck pain    Negative: Spinning or tilting sensation (vertigo) present now and one or more stroke risk factors (i.e.,  "hypertension, diabetes, prior stroke/TIA, heart attack, age over 60) (Exception: prior physician evaluation for this AND no different/worse than usual)    Negative: Loss of vision or double vision    Negative: Extra heart beats OR irregular heart beating (i.e., 'palpitations')    Negative: Difficulty breathing    Negative: Drinking very little and has signs of dehydration (e.g., no urine > 12 hours, very dry mouth, very lightheaded)    Negative: Follows bleeding (e.g., stomach, rectum, vagina) (Exception: became dizzy from sight of small amount blood)    Negative: Patient sounds very sick or weak to the triager    Lightheadedness (dizziness) present now, after 2 hours of rest and fluids    Negative: Spinning or tilting sensation (vertigo) present now    Negative: Fever > 103 F (39.4 C)    Negative: Fever > 100.0 F (37.8 C) and has diabetes mellitus or a weak immune system (e.g., HIV positive, cancer chemotherapy, organ transplant, splenectomy, chronic steroids)    Vomiting occurs with dizziness    Negative: Patient wants to be seen    Negative: Taking a medicine that could cause dizziness (e.g., blood pressure medications, diuretics)    Negative: Diabetes    Negative: Dizziness not present now, but is a chronic symptom (recurrent or ongoing AND lasting > 4 weeks)    Negative: Poor fluid intake probably causing dizziness    Negative: Recent heat exposure probably causing the dizziness    Negative: Sudden or prolonged standing probably causing dizziness    Answer Assessment - Initial Assessment Questions  1. DESCRIPTION: \"Describe your dizziness.\"      The feeling you get after having too many drinks.   2. LIGHTHEADED: \"Do you feel lightheaded?\" (e.g., somewhat faint, woozy, weak upon standing)      Woozy   3. VERTIGO: \"Do you feel like either you or the room is spinning or tilting?\" (i.e. vertigo)      NO   4. SEVERITY: \"How bad is it?\"  \"Do you feel like you are going to faint?\" \"Can you stand and walk?\"y    - " "MODERATE - interferes with normal activities (e.g., work, school)   5. ONSET:  \"When did the dizziness begin?\"      Few days ago. Went to the ER on 3/9/00362  6. AGGRAVATING FACTORS: \"Does anything make it worse?\" (e.g., standing, change in head position)      Standing or head moved quickly.  7. HEART RATE: \"Can you tell me your heart rate?\" \"How many beats in 15 seconds?\"  (Note: not all patients can do this)        No   8. CAUSE: \"What do you think is causing the dizziness?\"      Unsure   9. RECURRENT SYMPTOM: \"Have you had dizziness before?\" If so, ask: \"When was the last time?\" \"What happened that time?\"  HX of Syncope   10. OTHER SYMPTOMS: \"Do you have any other symptoms?\" (e.g., fever, chest pain, vomiting, diarrhea, bleeding)        Vomiting yesterday after breakfast due to the dizzy feeling when going up the stairs.    Protocols used: DIZZINESS-A-OH      Citlalli Huber RN Flex    "

## 2021-03-11 NOTE — TELEPHONE ENCOUNTER
General Call:     Who is calling:  Ignacio     Reason for Call:  Not feeling well    What are your questions or concerns:  Follow up to Emergency room visit. Continued woozy feeling difficult keeping my balance walking.  Getting worse I think    Date of last appointment with provider: 12/2/2020-EDITH    Okay to leave a detailed message:Yes at Home number on file [phone] (home)     Or you may send a [employer] message

## 2021-03-11 NOTE — PATIENT INSTRUCTIONS
Continue to use Meclizine and Zofran as needed.    Drink lots of water.     Follow-up with ENT and [facility] (if ENT doesn't have explanation).       [facility]: [phone]    [facility]  [address]  [place] [address]      [facility]  [address] [place] [address]      [facility]  [address]  [place] [address]      [facility]  [address]  [place] [address]

## 2021-03-11 NOTE — PROGRESS NOTES
Ignacio is a 70 year old who is being evaluated via a billable video visit.      How would you like to obtain your AVS? [employer]  If the video visit is dropped, the invitation should be resent by: Text to cell phone: WILL BE CONNECTING [employer]   Will anyone else be joining your video visit? No      Video Start Time: 3:38 PM    Assessment & Plan     Dizziness    Refer to ENT and [facility] for additional testing and treatment.    - OTOLARYNGOLOGY REFERRAL  - PHYSICAL THERAPY REFERRAL; Future      Tinnitus, bilateral    See above.    - OTOLARYNGOLOGY REFERRAL  - PHYSICAL THERAPY REFERRAL; Future             Patient Instructions   Continue to use Meclizine and Zofran as needed.    Drink lots of water.     Follow-up with ENT and [facility] (if ENT doesn't have explanation).       [facility]: [phone]    [facility]  [address]  [place] [address]      [facility]  [address] [place] [address]      [facility]  [address]  [place] [address]      [facility]  [address]  [place] [address]        Return if symptoms worsen or fail to improve.    [facility]    Lizy Pierre is a 70 year old who presents for the following health issues   HPI       ED/UC Followup:    Facility:  [facility]  Date of visit: 3/9/2021  Reason for visit: Dizziness, vomiting   Current Status: currently still having the dizziness- worsening a little- he was having bouts of lightheadedness when he went to the ER. Feeling more unstable now and needing to hold onto things to walk. Vomiting improved today but did vomit yesterday morning.     Prescribed meclizine and zofran at ER. Not taking Zofran as he is no longer feeling nauseous. He also has chronic tinnitus. Patient would like referral to ENT.        Review of Systems   Constitutional, HEENT,  cardiovascular, pulmonary, gi and gu systems are negative, except as otherwise noted.      Objective           Vitals:  No vitals were obtained today due to virtual visit.    Physical Exam   GENERAL: Healthy, alert and no distress  EYES: Eyes grossly normal to inspection.  No discharge or erythema, or obvious scleral/conjunctival abnormalities.  HENT: Normal cephalic/atraumatic.  External ears, nose and mouth without ulcers or lesions.  No nasal drainage visible.  NECK: No asymmetry, visible masses or scars  RESP: No audible wheeze, cough, or visible cyanosis.  No visible retractions or increased work of breathing.    SKIN: Visible skin clear. No significant rash, abnormal pigmentation or lesions.  NEURO: Cranial nerves grossly intact.  Mentation and speech appropriate for age.  PSYCH: Mentation appears normal, affect normal/bright, judgement and insight intact, normal speech and appearance well-groomed.    No testing indicated.            Video-Visit Details    Type of service:  Video Visit    Video End Time:3:47 PM    Originating Location (pt. Location): Home    Distant Location (provider location):  [facility] [employer]     Platform used for Video Visit: Datav[employer]

## 2021-03-15 ENCOUNTER — TRANSFERRED RECORDS (OUTPATIENT)
Dept: HEALTH INFORMATION MANAGEMENT | Facility: CLINIC | Age: 71
End: 2021-03-15

## 2021-03-15 ENCOUNTER — MYC MEDICAL ADVICE (OUTPATIENT)
Dept: FAMILY MEDICINE | Facility: CLINIC | Age: 71
End: 2021-03-15

## 2021-03-22 ENCOUNTER — OFFICE VISIT (OUTPATIENT)
Dept: URGENT CARE | Facility: URGENT CARE | Age: 71
End: 2021-03-22
Payer: COMMERCIAL

## 2021-03-22 VITALS
BODY MASS INDEX: 25.19 KG/M2 | OXYGEN SATURATION: 99 % | SYSTOLIC BLOOD PRESSURE: 140 MMHG | DIASTOLIC BLOOD PRESSURE: 82 MMHG | TEMPERATURE: 98.4 F | WEIGHT: 178.1 LBS | RESPIRATION RATE: 16 BRPM | HEART RATE: 63 BPM

## 2021-03-22 DIAGNOSIS — H02.89 EYELID PAIN, RIGHT: ICD-10-CM

## 2021-03-22 DIAGNOSIS — H01.001 BLEPHARITIS OF RIGHT UPPER EYELID, UNSPECIFIED TYPE: Primary | ICD-10-CM

## 2021-03-22 PROCEDURE — 99213 OFFICE O/P EST LOW 20 MIN: CPT | Performed by: FAMILY MEDICINE

## 2021-03-22 RX ORDER — ERYTHROMYCIN 5 MG/G
0.25 OINTMENT OPHTHALMIC AT BEDTIME
Qty: 3.5 G | Refills: 0 | Status: SHIPPED | OUTPATIENT
Start: 2021-03-22 | End: 2021-03-29

## 2021-03-22 NOTE — PROGRESS NOTES
Chief Complaint   Patient presents with     Eye Problem     71 yo F presents with the following right eye discomfort, red upper lid, does not itch but uncomfortable onset T. No issues with vision.         Medical Decision Making:    ASSESMENT AND PLAN     Ignacio was seen today for eye problem.    Diagnoses and all orders for this visit:    Blepharitis of right upper eyelid, unspecified type  -     erythromycin (ROMYCIN) 5 MG/GM ophthalmic ointment; Place 0.25 inches into the right eye At Bedtime for 7 days          Tylenol, Fluids, Rest and apply ointment as directed   To apply a warm compress:  1. Wash your hands with soap and warm water.  2. Wet a clean washcloth with warm water. Then wring it out.  3. Close your eyes and place the washcloth over your eyelids for 3 to 5 minutes. This helps loosen scales or crusts.  4. Wet the washcloth again as often as needed to keep it warm.  Repeat 2 or more times a day. Use a clean washcloth each time.  To use an eyelid scrub:  1. Wash your hands with soap and warm water.  2. Use a ready-made eyelid scrub. Or mix 3 drops of baby shampoo in 1/4 cup of warm water.  3. Dip a lint-free pad, cotton swab, or clean washcloth in the scrub.  4. Close one eye and gently scrub the base of the eyelid.  5. Rinse the lid in cool water and dry with a clean towel.  6. Repeat on your other eye.      Differential Diagnosis:  Eye Problem: Bacterial conjunctivitis  Viral conjunctivitis  Allergic conjunctivitis  Stye (external)  Blepharitis      See orders in Epic  Pt verbalized and agreed with the plan and is aware of the worsening symptoms for which would need to follow up .  Pt was stable during time of discharge from the clinic           Time  spent on the date of the encounter doing chart review, patient visit, documentation and discussion with family     If not improving or if condition worsens, follow up with your Primary Care Provider    SUBJECTIVE     Ignacio Sarmiento is a 70 year old  male presenting with a chief complaint of    Chief Complaint   Patient presents with     Eye Problem     71 yo F presents with the following right eye discomfort, red upper lid, does not itch but uncomfortable onset T. No issues with vision.       Eye Problem    Onset of symptoms was 1 day(s) ago.   Location: right eye   Course of illness is worsening.    Severity moderate  Current and Associated symptoms: eyelid swelling with tenderness   Treatment measures tried include none  Context: none             Past Medical History:   Diagnosis Date     Cataracts, bilateral      Dyspepsia 10/18/2018     Elevated blood pressure reading without diagnosis of hypertension 10/18/2018     History of skin cancer 10/18/2018     Hyperlipidemia LDL goal <160 11/5/2018     Midline low back pain without sciatica 10/19/2018     Midline thoracic back pain, unspecified chronicity 10/18/2018     Mobitz I 8/12/2019     Primary osteoarthritis of ankle, unspecified laterality 11/9/2018     Seborrheic dermatitis 10/18/2018     Syncope 7/1/2019     Systolic hypertension 7/1/2019     Current Outpatient Medications   Medication Sig Dispense Refill     erythromycin (ROMYCIN) 5 MG/GM ophthalmic ointment Place 0.25 inches into the right eye At Bedtime for 7 days 3.5 g 0     meclizine (ANTIVERT) 25 MG tablet Take 1 tablet (25 mg) by mouth 3 times daily as needed for dizziness 20 tablet 0     Social History     Tobacco Use     Smoking status: Former Smoker     Packs/day: 0.00     Smokeless tobacco: Never Used   Substance Use Topics     Alcohol use: Yes     Comment: 2 daily     History reviewed. No pertinent family history.      ROS:    10 point ROS of systems including Constitutional,Respiratory, Cardiovascular, Gastroenterology, Genitourinary, Integumentary, Muscularskeletal, Psychiatric ,neurological were all negative except for pertinent positives noted in my HPI         OBJECTIVE:    BP (!) 140/82   Pulse 63   Temp 98.4  F (36.9  C)   Resp 16    Wt 80.8 kg (178 lb 1.6 oz)   SpO2 99%   BMI 25.19 kg/m    GENERAL APPEARANCE: healthy, alert and no distress  EYES: rt eye EOMI,  PERRL, conjunctiva clear, rt upper eyelid swelling with tenderness , left eye EOMI, PERRL, conjunctiva clear   HENT: ear canals and TM's normal.  Nose and mouth without ulcers, erythema or lesions  RESP: lungs clear to auscultation - no rales, rhonchi or wheezes  CV: regular rates and rhythm, normal S1 S2, no murmur noted  SKIN: no suspicious lesions or rashes  PSYCH: mentation appears normal  Physical Exam      (Note was completed, in part, with [employer] voice-recognition software. Documentation reviewed, but some grammatical, spelling, and word errors may remain.)  Linda Echeverria MD

## 2021-03-22 NOTE — PATIENT INSTRUCTIONS
Patient Education     Treating Blepharitis: Self-Care    To treat the problem, keep your eyelids clean. Warm compresses can reduce redness and swelling, and help clean your eyelids, too. You may also need to wash the area gently with an eyelid scrub when you wake up.  To apply a warm compress:  1. Wash your hands with soap and warm water.  2. Wet a clean washcloth with warm water. Then wring it out.  3. Close your eyes and place the washcloth over your eyelids for 3 to 5 minutes. This helps loosen scales or crusts.  4. Wet the washcloth again as often as needed to keep it warm.  Repeat 2 or more times a day. Use a clean washcloth each time.  To use an eyelid scrub:  1. Wash your hands with soap and warm water.  2. Use a ready-made eyelid scrub. Or mix 3 drops of baby shampoo in 1/4 cup of warm water.  3. Dip a lint-free pad, cotton swab, or clean washcloth in the scrub.  4. Close one eye and gently scrub the base of the eyelid.  5. Rinse the lid in cool water and dry with a clean towel.  6. Repeat on your other eye.  [employer] last reviewed this educational content on 3/1/2018    0926-9115 [employer]. All rights reserved. This information is not intended as a substitute for professional medical care. Always follow your healthcare professional's instructions.           Patient Education     Treating Blepharitis: Medicine and Follow-Up  Medicine    Your eye doctor may prescribe eye drops or an ointment. These will help ease redness, swelling, and irritation. When using these medicines, make sure that the tip of the tube or bottle doesn t touch your eyelid. Your doctor may also prescribe oral antibiotics or an antibiotic ointment. Or he or she may prescribe eye drops with cortisone. These medicines can help clear up a bacterial infection, a cyst, or a stye. If you take medicine, you must still use warm compresses and eyelid scrubs as recommended by your doctor.  Follow-up appointments  Your eye doctor  needs to recheck your eyes during your treatment. This is to make sure the redness and swelling (inflammation) is under control. Regular eye exams are also the best way to prevent other eye problems. Many eye diseases have no symptoms until the eye is already damaged. Finding and treating a problem early can help prevent something more severe.  [employer] last reviewed this educational content on 3/1/2018    2691-5606 The [employer]. All rights reserved. This information is not intended as a substitute for professional medical care. Always follow your healthcare professional's instructions.           Patient Education     What Is Blepharitis?    Blepharitis is a redness and swelling (inflammation) of the eyelids. The membrane covering the inside of your eyelid and the white of your eye may also become inflamed. Blepharitis can be caused by germs (bacteria) on your eyelids or on the skin around your eyes. Dandruff or oily skin can also cause blepharitis. Wearing contact lenses or makeup can make your symptoms worse. Blepharitis can t always be cured. But it can be controlled.  What are the signs and symptoms?  When you have blepharitis, oil and bacteria coat your eyelids near the base of your eyelashes. This can cause:    Redness, irritation, and tearing of your eyelids    Swollen, tender eyelids    Blurred vision    Itching around your eyelashes    Greasy flakes or scales around your eyelashes    Hard crusts at the base of your eyelashes. These crusts may cause your eyelashes to fall out.  Flakes or crusts can form during the night. If this happens, it may be hard for you to open your eyes in the morning. If blepharitis is not treated promptly, it can lead to an infection at the base of an eyelash or oil gland. This infection is called a stye. Left untreated, it may become a chronic cyst. You may need surgery to remove it.  [employer] last reviewed this educational content on 3/1/2018    5027-0951 The  [employer]. All rights reserved. This information is not intended as a substitute for professional medical care. Always follow your healthcare professional's instructions.

## 2021-09-12 ENCOUNTER — HEALTH MAINTENANCE LETTER (OUTPATIENT)
Age: 71
End: 2021-09-12

## 2021-11-08 ENCOUNTER — TRANSFERRED RECORDS (OUTPATIENT)
Dept: HEALTH INFORMATION MANAGEMENT | Facility: CLINIC | Age: 71
End: 2021-11-08
Payer: COMMERCIAL

## 2022-01-02 ENCOUNTER — HEALTH MAINTENANCE LETTER (OUTPATIENT)
Age: 72
End: 2022-01-02

## 2022-02-27 ENCOUNTER — HEALTH MAINTENANCE LETTER (OUTPATIENT)
Age: 72
End: 2022-02-27

## 2022-05-31 ENCOUNTER — VIRTUAL VISIT (OUTPATIENT)
Dept: FAMILY MEDICINE | Facility: CLINIC | Age: 72
End: 2022-05-31
Payer: COMMERCIAL

## 2022-05-31 DIAGNOSIS — U07.1 INFECTION DUE TO 2019 NOVEL CORONAVIRUS: Primary | ICD-10-CM

## 2022-05-31 PROCEDURE — 99213 OFFICE O/P EST LOW 20 MIN: CPT | Mod: 95 | Performed by: NURSE PRACTITIONER

## 2022-05-31 NOTE — PATIENT INSTRUCTIONS
Infection due to 2019 novel coronavirus  COVID symptom onset yesterday starting with sore throat, developed body aches and headache today and tested positive this afternoon. Discussed antiviral therapy, risks versus benefits, high risk indications as well as side effects.  Patient does meet high risk conditions given age and history of hypertension.  Patient would like to start antiviral therapy, will start Paxlovid. Patient is currently not on any medications, no concern for medication interactions and normal kidney functions recently.  Advised to monitor blood pressure readings and notify provider if significantly elevated consistently.  Reviewed quarantine guidelines and recommendations.  Encouraged to continue good supportive cares including hydration, rest, about elevation and Tylenol and or ibuprofen as needed.  Recommend follow-up in clinic if symptoms not improving or worsening in the next 5 to 7 days.  - nirmatrelvir and ritonavir (PAXLOVID) therapy pack; Take 3 tablets by mouth 2 times daily for 5 days Take 2 Nirmatrelvir tablets and 1 Ritonavir tablet twice daily for 5 days.      Discharge Instructions for COVID-19 Patients  You have--or may have--COVID-19. Please follow the instructions listed below.   If you have a weakened immune system, discuss with your doctor any other actions you need to take.  How can I protect others?  If you have symptoms (fever, cough, body aches or trouble breathing):  Stay home and away from others (self-isolate) until:  Your other symptoms have resolved (gotten better). And   You've had no fever--and no medicine that reduces fever--for 1 full day (24 hours). And   At least 10 days have passed since your symptoms started. (You may need to wait 20 days. Follow the advice of your care team.)  If you don't show symptoms, but testing showed that you have COVID-19:  Stay home and away from others (self-isolate) until at least 10 days have passed since the date of your first  "positive COVID-19 test.  During this time  Stay in your own room, even for meals. Use your own bathroom if you can.  Stay away from others in your home. No hugging, kissing or shaking hands. No visitors.  Don't go to work, school or anywhere else.  Clean \"high touch\" surfaces often (doorknobs, counters, handles). Use household cleaning spray or wipes.  You'll find a full list of  on the EPA website: www.epa.gov/pesticide-registration/list-n-disinfectants-use-against-sars-cov-2.  Cover your mouth and nose with a mask or other face covering to avoid spreading germs.  Wash your hands and face often. Use soap and water.  Caregivers in these groups are at risk for severe illness due to COVID-19:  People 65 years and older  People who live in a nursing home or long-term care facility  People with chronic disease (lung, heart, cancer, diabetes, kidney, liver, immunologic)  People who have a weakened immune system, including those who:  Are in cancer treatment  Take medicine that weakens the immune system, such as corticosteroids  Had a bone marrow or organ transplant  Have an immune deficiency  Have poorly controlled HIV or AIDS  Are obese (body mass index of 40 or higher)  Smoke regularly  Caregivers should wear gloves while washing dishes, handling laundry and cleaning bedrooms and bathrooms.  Use caution when washing and drying laundry: Don't shake dirty laundry and use the warmest water setting that you can.  For more tips on managing your health at home, go to www.cdc.gov/coronavirus/2019-ncov/downloads/10Things.pdf.  How can I take care of myself at home?  Get lots of rest. Drink extra fluids (unless a doctor has told you not to).  Take Tylenol (acetaminophen) for fever or pain. If you have liver or kidney problems, ask your family doctor if it's okay to take Tylenol.   Adults can take either:   650 mg (two 325 mg pills) every 4 to 6 hours, or   1,000 mg (two 500 mg pills) every 8 hours as needed.  Note: " Don't take more than 3,000 mg in one day. Acetaminophen is found in many medicines (both prescribed and over-the-counter medicines). Read all labels to be sure you don't take too much.   For children, check the Tylenol bottle for the right dose. The dose is based on the child's age or weight.  If you have other health problems (like cancer, heart failure, an organ transplant or severe kidney disease): Call your specialty clinic if you don't feel better in the next 2 days.  Know when to call 911. Emergency warning signs include:  Trouble breathing or shortness of breath  Pain or pressure in the chest that doesn't go away  Feeling confused like you haven't felt before, or not being able to wake up  Bluish-colored lips or face  Your doctor may have prescribed a blood thinner medicine. Follow their instructions.  Where can I get more information?  [facility] - About COVID-19:   [email or web address]  CDC - What to Do If You're Sick: www.cdc.gov/coronavirus/2019-ncov/about/steps-when-sick.html  CDC - Ending Home Isolation: www.cdc.gov/coronavirus/2019-ncov/hcp/disposition-in-home-patients.html  CDC - Caring for Someone: www.cdc.gov/coronavirus/2019-ncov/if-you-are-sick/care-for-someone.html  [facility] - Interim Guidance for Hospital Discharge to Home: www.health.[facility].[place]/diseases/coronavirus/hcp/hospdischarge.pdf  Below are the COVID-19 hotlines at the [place] Department of Health ([facility]). Interpreters are available.  For health questions: Call 110-857-1080 or 1-670.379.7237 (7 a.m. to 7 p.m.)  For questions about schools and childcare: Call 864-011-1864 or 1-915.488.4777 (7 a.m. to 7 p.m.)    For informational purposes only. Not to replace the advice of your health care provider. Clinically reviewed by Dr. Arnoldo Mott.   Copyright   2020 [employer]. All rights reserved. [employer] [id number] - REV 01/05/21.

## 2022-05-31 NOTE — PROGRESS NOTES
Ignacio is a 71 year old who is being evaluated via a billable video visit.      How would you like to obtain your AVS? MyChart  If the video visit is dropped, the invitation should be resent by: Send to e-mail at: [email or web address]  Will anyone else be joining your video visit? No      Video Start Time: 3:33 PM    Assessment & Plan     Infection due to 2019 novel coronavirus  COVID symptom onset yesterday starting with sore throat, developed body aches and headache today and tested positive this afternoon. Discussed antiviral therapy, risks versus benefits, high risk indications as well as side effects.  Patient does meet high risk conditions given age and history of hypertension.  Patient would like to start antiviral therapy, will start Paxlovid. Patient is currently not on any medications, no concern for medication interactions and normal kidney functions recently.  Advised to monitor blood pressure readings and notify provider if significantly elevated consistently.  Reviewed quarantine guidelines and recommendations.  Encouraged to continue good supportive cares including hydration, rest, about elevation and Tylenol and or ibuprofen as needed.  Recommend follow-up in clinic if symptoms not improving or worsening in the next 5 to 7 days.  - nirmatrelvir and ritonavir (PAXLOVID) therapy pack; Take 3 tablets by mouth 2 times daily for 5 days Take 2 Nirmatrelvir tablets and 1 Ritonavir tablet twice daily for 5 days.             See Patient Instructions     Return in about 1 week (around 6/7/2022), or if symptoms worsen or fail to improve.    Elvia Keys, MARU, APRN-CNP   [facility]    Subjective   Ignacio is a 71 year old who presents for the following health issues  accompanied by his spouse.    HPI       COVID-19 Symptom Review  How many days ago did these symptoms start? Sore throat yesterday but now resolved. Body aches and headache today. Home Covid test was done today at 2 pm and  it was positive almost immediately.     Are any of the following symptoms significant for you?    New or worsening difficulty breathing? No    Worsening cough? Yes, it's a dry cough.     Fever or chills? No 99.3 temperature this morning.     Headache: YES    Sore throat: no    Chest pain: no    Diarrhea: no    Body aches? YES     Exposures: when to a visitation and a  last week. [place] Covid was present.   What treatments has patient tried? Cough drops and water.    Does patient live in a nursing home, group home, or shelter? no  Does patient have a way to get food/medications during quarantined? Yes, I have a friend or family member who can help me.    {Provider  Link to COVID SmartSet :113003}            Review of Systems   Constitutional, HEENT, cardiovascular, pulmonary, gi and gu systems are negative, except as otherwise noted.      Objective           Vitals:  No vitals were obtained today due to virtual visit.    Physical Exam   GENERAL: Healthy, alert and no distress  EYES: Eyes grossly normal to inspection.  No discharge or erythema, or obvious scleral/conjunctival abnormalities.  RESP: No audible wheeze, cough, or visible cyanosis.  No visible retractions or increased work of breathing.    SKIN: Visible skin clear. No significant rash, abnormal pigmentation or lesions.  NEURO: Cranial nerves grossly intact.  Mentation and speech appropriate for age.  PSYCH: Mentation appears normal, affect normal/bright, judgement and insight intact, normal speech and appearance well-groomed.    Diagnostic Test Results:  none            Video-Visit Details    Type of service:  Video Visit    Video End Time:3:49 PM    Originating Location (pt. Location): Home    Distant Location (provider location):  [facility]     Platform used for Video Visit: [address]     Chart documentation with Dragon Voice recognition Software. Although reviewed after completion, some words and grammatical errors may  remain.

## 2022-06-24 ASSESSMENT — ENCOUNTER SYMPTOMS
HEADACHES: 0
EYE PAIN: 0
PARESTHESIAS: 0
DIZZINESS: 0
FREQUENCY: 0
SHORTNESS OF BREATH: 0
MYALGIAS: 0
DIARRHEA: 0
CHILLS: 0
SORE THROAT: 0
HEMATOCHEZIA: 0
JOINT SWELLING: 0
COUGH: 1
HEARTBURN: 0
ABDOMINAL PAIN: 0
PALPITATIONS: 0
WEAKNESS: 0
DYSURIA: 0
FEVER: 0
ARTHRALGIAS: 1
NAUSEA: 0
NERVOUS/ANXIOUS: 0
HEMATURIA: 0
CONSTIPATION: 0

## 2022-06-24 ASSESSMENT — ACTIVITIES OF DAILY LIVING (ADL): CURRENT_FUNCTION: NO ASSISTANCE NEEDED

## 2022-06-29 ENCOUNTER — OFFICE VISIT (OUTPATIENT)
Dept: FAMILY MEDICINE | Facility: CLINIC | Age: 72
End: 2022-06-29
Payer: COMMERCIAL

## 2022-06-29 VITALS
TEMPERATURE: 98.1 F | DIASTOLIC BLOOD PRESSURE: 68 MMHG | HEART RATE: 64 BPM | OXYGEN SATURATION: 99 % | SYSTOLIC BLOOD PRESSURE: 122 MMHG | RESPIRATION RATE: 18 BRPM | BODY MASS INDEX: 24.26 KG/M2 | WEIGHT: 169.5 LBS | HEIGHT: 70 IN

## 2022-06-29 DIAGNOSIS — Z23 ENCOUNTER FOR IMMUNIZATION: ICD-10-CM

## 2022-06-29 DIAGNOSIS — Z13.6 CARDIOVASCULAR SCREENING; LDL GOAL LESS THAN 130: ICD-10-CM

## 2022-06-29 DIAGNOSIS — R19.5 POSITIVE COLORECTAL CANCER SCREENING USING COLOGUARD TEST: ICD-10-CM

## 2022-06-29 DIAGNOSIS — Z12.5 SCREENING FOR PROSTATE CANCER: ICD-10-CM

## 2022-06-29 DIAGNOSIS — I47.10 PAROXYSMAL SUPRAVENTRICULAR TACHYCARDIA (H): ICD-10-CM

## 2022-06-29 DIAGNOSIS — Z00.00 MEDICARE ANNUAL WELLNESS VISIT, SUBSEQUENT: Primary | ICD-10-CM

## 2022-06-29 DIAGNOSIS — Z12.11 SCREEN FOR COLON CANCER: ICD-10-CM

## 2022-06-29 DIAGNOSIS — C76.0 MALIGNANT NEOPLASM OF HEAD, FACE, AND NECK (H): ICD-10-CM

## 2022-06-29 PROCEDURE — 99397 PER PM REEVAL EST PAT 65+ YR: CPT | Mod: 25 | Performed by: INTERNAL MEDICINE

## 2022-06-29 PROCEDURE — G0009 ADMIN PNEUMOCOCCAL VACCINE: HCPCS | Performed by: INTERNAL MEDICINE

## 2022-06-29 PROCEDURE — 90677 PCV20 VACCINE IM: CPT | Performed by: INTERNAL MEDICINE

## 2022-06-29 ASSESSMENT — ENCOUNTER SYMPTOMS
SHORTNESS OF BREATH: 0
ABDOMINAL PAIN: 0
PARESTHESIAS: 0
COUGH: 1
FEVER: 0
HEMATOCHEZIA: 0
PALPITATIONS: 0
EYE PAIN: 0
JOINT SWELLING: 0
DIZZINESS: 0
DYSURIA: 0
NERVOUS/ANXIOUS: 0
HEADACHES: 0
HEARTBURN: 0
DIARRHEA: 0
CONSTIPATION: 0
CHILLS: 0
WEAKNESS: 0
HEMATURIA: 0
NAUSEA: 0
ARTHRALGIAS: 1
MYALGIAS: 0

## 2022-06-29 ASSESSMENT — ACTIVITIES OF DAILY LIVING (ADL): CURRENT_FUNCTION: NO ASSISTANCE NEEDED

## 2022-06-29 ASSESSMENT — PAIN SCALES - GENERAL: PAINLEVEL: MILD PAIN (3)

## 2022-06-29 NOTE — PATIENT INSTRUCTIONS
The 10-year ASCVD risk score (Arash CLINTON Jr., et al., 2013) is: 13.6%    Values used to calculate the score:      Age: 71 years      Sex: Male      Is Non- : No      Diabetic: No      Tobacco smoker: No      Systolic Blood Pressure: 122 mmHg      Is BP treated: No      HDL Cholesterol: 81 mg/dL      Total Cholesterol: 201 mg/dL

## 2022-06-29 NOTE — PROGRESS NOTES
"Chief Complaint   Patient presents with     Physical     Pt lives in [place] and desires transfer of care to [facility].    SUBJECTIVE:   Ignacio Sarmiento is a 71 year old male who presents for Preventive Visit.      Patient has been advised of split billing requirements and indicates understanding: Yes  Are you in the first 12 months of your Medicare coverage?  No    Healthy Habits:     In general, how would you rate your overall health?  Good    Frequency of exercise:  4-5 days/week    Duration of exercise:  30-45 minutes    Do you usually eat at least 4 servings of fruit and vegetables a day, include whole grains    & fiber and avoid regularly eating high fat or \"junk\" foods?  Yes    Taking medications regularly:  Yes    Barriers to taking medications:  None    Medication side effects:  None    Ability to successfully perform activities of daily living:  No assistance needed    Home Safety:  No safety concerns identified    Hearing Impairment:  No hearing concerns    In the past 6 months, have you been bothered by leaking of urine?  No    In general, how would you rate your overall mental or emotional health?  Excellent      PHQ-2 Total Score: 0    Additional concerns today:  Yes    Do you feel safe in your environment? Yes    Have you ever done Advance Care Planning? (For example, a Health Directive, POLST, or a discussion with a medical provider or your loved ones about your wishes): Yes, patient states has an Advance Care Planning document and will bring a copy to the clinic.       Fall risk  Fallen 2 or more times in the past year?: No  Any fall with injury in the past year?: No    Cognitive Screening   1) Repeat 3 items (Leader, Season, Table)    2) Clock draw: NORMAL  3) 3 item recall: Recalls 3 objects  Results: 3 items recalled: COGNITIVE IMPAIRMENT LESS LIKELY    Mini-CogTM Copyright GALILEO Ferreira. Licensed by the author for use in [employer]; reprinted with permission (kiera@.[email or web address]). " All rights reserved.      Do you have sleep apnea, excessive snoring or daytime drowsiness?: no    Reviewed and updated as needed this visit by clinical staff   Tobacco  Allergies  Meds   Med Hx  Surg Hx  Fam Hx  Soc Hx          Reviewed and updated as needed this visit by Provider   Tobacco  Allergies  Meds  Problems  Med Hx  Surg Hx  Fam Hx           Social History     Tobacco Use     Smoking status: Former Smoker     Packs/day: 0.00     Quit date: 1978     Years since quittin.5     Smokeless tobacco: Never Used   Substance Use Topics     Alcohol use: Yes     Comment: 2 daily         Alcohol Use 2022   Prescreen: >3 drinks/day or >7 drinks/week? Yes   AUDIT SCORE  6       Current providers sharing in care for this patient include:   Patient Care Team:  Julio Cesar Chávez MD as PCP - General (Family Practice)  Elvia Keys APRN CNP as Assigned PCP    The following health maintenance items are reviewed in Epic and correct as of today:  Health Maintenance Due   Topic Date Due     ZOSTER IMMUNIZATION (1 of 2) Never done     AORTIC ANEURYSM SCREENING (SYSTEM ASSIGNED)  Never done     Pneumococcal Vaccine: 65+ Years (2 - PPSV23 or PCV20) 2019     ANNUAL REVIEW OF HM ORDERS  2021     COLORECTAL CANCER SCREENING  2021     COVID-19 Vaccine (4 - Booster for Pfizer series) 2022     Labs reviewed in EPIC  BP Readings from Last 3 Encounters:   22 122/68   21 (!) 140/82   03/10/21 139/74    Wt Readings from Last 3 Encounters:   22 76.9 kg (169 lb 8 oz)   21 80.8 kg (178 lb 1.6 oz)   20 80.3 kg (177 lb)                  Patient Active Problem List   Diagnosis     Seborrheic dermatitis     History of skin cancer     Midline thoracic back pain, unspecified chronicity     Dyspepsia     Midline low back pain without sciatica     Hyperlipidemia LDL goal <160     Primary osteoarthritis of ankle, unspecified laterality     Syncope     Systolic  hypertension     Mobitz I     Paroxysmal supraventricular tachycardia (H)     Malignant neoplasm of head, face, and neck (H)     Past Surgical History:   Procedure Laterality Date     ARTHROSCOPY KNEE Right      ARTHROSCOPY KNEE WITH MENISCECTOMY Left     open       Social History     Tobacco Use     Smoking status: Former Smoker     Packs/day: 0.00     Quit date: 1978     Years since quittin.5     Smokeless tobacco: Never Used   Substance Use Topics     Alcohol use: Yes     Comment: 2 daily     History reviewed. No pertinent family history.      No current outpatient medications on file.     No Known Allergies  Recent Labs   Lab Test 21  2132 20  0912 10/18/18  1539   LDL  --  105* 101*   HDL  --  81 84   TRIG  --  75 99   ALT  --  25 21   CR 0.70 0.70 0.86   GFRESTIMATED >90 >90 88   GFRESTBLACK >90 >90 >90   POTASSIUM 4.2 3.9 3.8   TSH  --   --  1.43              Review of Systems   Constitutional: Negative for chills and fever.   HENT: Positive for hearing loss. Negative for congestion and ear pain.    Eyes: Negative for pain and visual disturbance.   Respiratory: Positive for cough. Negative for shortness of breath.    Cardiovascular: Negative for chest pain, palpitations and peripheral edema.   Gastrointestinal: Negative for abdominal pain, constipation, diarrhea, heartburn, hematochezia and nausea.   Genitourinary: Negative for dysuria, genital sores, hematuria, impotence, penile discharge and urgency.   Musculoskeletal: Positive for arthralgias. Negative for joint swelling and myalgias.   Skin: Negative for rash.   Neurological: Negative for dizziness, weakness, headaches and paresthesias.   Psychiatric/Behavioral: Negative for mood changes. The patient is not nervous/anxious.      2019 he was evaluated by cardiology   He had a 1 week ZIO monitor with his primary physician that showed asymptomatic SVT as well as second-degree type I AV block when sleeping at night or napping in the  "afternoon.  He denies further symptoms.     5/31/2022 he was dx with COVID -19 infection, reviewed virtual visit and treated with Paxlovid. Intermittent cough persists.      OBJECTIVE:   /68 (BP Location: Right arm, Patient Position: Sitting, Cuff Size: Adult Regular)   Pulse 64   Temp 98.1  F (36.7  C) (Oral)   Resp 18   Ht 1.765 m (5' 9.5\")   Wt 76.9 kg (169 lb 8 oz)   SpO2 99%   BMI 24.67 kg/m   Estimated body mass index is 24.67 kg/m  as calculated from the following:    Height as of this encounter: 1.765 m (5' 9.5\").    Weight as of this encounter: 76.9 kg (169 lb 8 oz).  Physical Exam  GENERAL: healthy, alert and no distress  EYES: Eyes grossly normal to inspection  HENT: ear canals and TM's normal, nose and mouth without ulcers or lesions  NECK: no adenopathy, no asymmetry, masses, or scars and thyroid normal to palpation  RESP: lungs clear to auscultation - no rales, rhonchi or wheezes  CV: regular rate and rhythm, normal S1 S2, no S3 or S4, no murmur, click or rub, no peripheral edema and peripheral pulses strong  ABDOMEN: soft, nontender, no hepatosplenomegaly, no masses and bowel sounds normal  MS: no gross musculoskeletal defects noted, no edema  NEURO: Normal strength and tone, sensory exam grossly normal, mentation intact, gait normal including heel/toe/tandem walking and Romberg normal  PSYCH: mentation appears normal, affect normal/bright    Diagnostic Test Results:  Labs reviewed in Epic        ASSESSMENT / PLAN:   (Z00.00) Medicare annual wellness visit, subsequent  (primary encounter diagnosis)  Comment: immunizations: eligible for Shingrix series and COVID 19 booster; Dx and treated for COVID infection 5/31/22, colon screening due- prefers less invasive eval.  Plan: see orders    (Z12.11) Screen for colon cancer  Comment: prefers less invasive evaluation; interested in Cologuard; reviewed options.  Plan: COLOGUARD(EXACT SCIENCES)          (Z23) Encounter for immunization  Comment: " "immunizations: eligible for Shingrix series and COVID 19 booster; Dx and treated for COVID infection 5/31/22; interested in PCV20 today. He'll plan vax w/spouse  Plan: Pneumococcal 20 Valent Conjugate (PCV20)          (Z12.5) Screening for prostate cancer  Comment: nocturia 1-2 per night.  Plan: screening PSA    (Z13.6) CARDIOVASCULAR SCREENING; LDL GOAL LESS THAN 130  Comment: lipids reviewed; pt not interested in lipid lowering medications; prefers healthy diet and regular exercise.   Plan: Comprehensive metabolic panel, Lipid panel         reflex to direct LDL Fasting         (I47.1) Paroxysmal supraventricular tachycardia (H)  Comment: cardiac evaluation done in 2019; reviewed cards note, past leadless EKG; no treatment; no further intervention  Plan: no ongoing concerns expressed.    (C76.0) Malignant neoplasm of head, face, and neck (H)  Comment: hx of skin cancer; nonmelanoma; lived in [place] for 8 years. monitoring for changes.   Plan: ongoing assessment    Patient has been advised of split billing requirements and indicates understanding: Yes    COUNSELING:  Reviewed preventive health counseling, as reflected in patient instructions       Regular exercise       Healthy diet/nutrition       Immunizations    Vaccinated for: Pneumococcal and discussed future Zoster and COVID Booster        Colon cancer screening    Estimated body mass index is 24.67 kg/m  as calculated from the following:    Height as of this encounter: 1.765 m (5' 9.5\").    Weight as of this encounter: 76.9 kg (169 lb 8 oz).        He reports that he quit smoking about 44 years ago. He smoked 0.00 packs per day. He has never used smokeless tobacco.      Appropriate preventive services were discussed with this patient, including applicable screening as appropriate for cardiovascular disease, diabetes, osteopenia/osteoporosis, and glaucoma.  As appropriate for age/gender, discussed screening for colorectal cancer, prostate cancer, breast " cancer, and cervical cancer. Checklist reviewing preventive services available has been given to the patient.    Reviewed patients plan of care and provided an AVS. The Complex Care Plan (for patients with higher acuity and needing more deliberate coordination of services) for Ignacio meets the Care Plan requirement. This Care Plan has been established and reviewed with the Patient.    Counseling Resources:  ATP IV Guidelines  Pooled Cohorts Equation Calculator  Breast Cancer Risk Calculator  Breast Cancer: Medication to Reduce Risk  FRAX Risk Assessment  ICSI Preventive Guidelines  Dietary Guidelines for Americans, 2010  USDA's MyPlate  ASA Prophylaxis  Lung CA Screening    Nieves Gaston MD  Internal Medicine   [facility]    Identified Health Risks:

## 2022-06-30 PROBLEM — C76.0 MALIGNANT NEOPLASM OF HEAD, FACE, AND NECK (H): Status: ACTIVE | Noted: 2022-06-30

## 2022-06-30 PROBLEM — I47.10 PAROXYSMAL SUPRAVENTRICULAR TACHYCARDIA (H): Status: ACTIVE | Noted: 2022-06-30

## 2022-07-21 LAB — NONINV COLON CA DNA+OCC BLD SCRN STL QL: POSITIVE

## 2022-07-25 ENCOUNTER — LAB (OUTPATIENT)
Dept: LAB | Facility: CLINIC | Age: 72
End: 2022-07-25
Payer: COMMERCIAL

## 2022-07-25 DIAGNOSIS — Z13.6 CARDIOVASCULAR SCREENING; LDL GOAL LESS THAN 130: ICD-10-CM

## 2022-07-25 LAB
ALBUMIN SERPL-MCNC: 3.8 G/DL (ref 3.4–5)
ALP SERPL-CCNC: 62 U/L (ref 40–150)
ALT SERPL W P-5'-P-CCNC: 21 U/L (ref 0–70)
ANION GAP SERPL CALCULATED.3IONS-SCNC: 1 MMOL/L (ref 3–14)
AST SERPL W P-5'-P-CCNC: 16 U/L (ref 0–45)
BILIRUB SERPL-MCNC: 0.6 MG/DL (ref 0.2–1.3)
BUN SERPL-MCNC: 9 MG/DL (ref 7–30)
CALCIUM SERPL-MCNC: 9.2 MG/DL (ref 8.5–10.1)
CHLORIDE BLD-SCNC: 107 MMOL/L (ref 94–109)
CHOLEST SERPL-MCNC: 206 MG/DL
CO2 SERPL-SCNC: 31 MMOL/L (ref 20–32)
CREAT SERPL-MCNC: 0.78 MG/DL (ref 0.66–1.25)
FASTING STATUS PATIENT QL REPORTED: YES
GFR SERPL CREATININE-BSD FRML MDRD: >90 ML/MIN/1.73M2
GLUCOSE BLD-MCNC: 108 MG/DL (ref 70–99)
HDLC SERPL-MCNC: 83 MG/DL
LDLC SERPL CALC-MCNC: 107 MG/DL
NONHDLC SERPL-MCNC: 123 MG/DL
POTASSIUM BLD-SCNC: 4.1 MMOL/L (ref 3.4–5.3)
PROT SERPL-MCNC: 6.9 G/DL (ref 6.8–8.8)
SODIUM SERPL-SCNC: 139 MMOL/L (ref 133–144)
TRIGL SERPL-MCNC: 82 MG/DL

## 2022-07-25 PROCEDURE — 80061 LIPID PANEL: CPT

## 2022-07-25 PROCEDURE — 80053 COMPREHEN METABOLIC PANEL: CPT

## 2022-07-25 PROCEDURE — 36415 COLL VENOUS BLD VENIPUNCTURE: CPT

## 2022-09-06 ENCOUNTER — TELEPHONE (OUTPATIENT)
Dept: GASTROENTEROLOGY | Facility: CLINIC | Age: 72
End: 2022-09-06

## 2022-09-06 NOTE — TELEPHONE ENCOUNTER
Screening Questions    BlueKIND OF PREP RedLOCATION [review exclusion criteria] GreenSEDATION TYPE      1. Are you active on mychart? y    2. What insurance is in the chart? [facility]     3.   Ordering/Referring Provider: Nieves Gaston MD       4. BMI   (If greater than 40 review exclusion criteria [PAC APPT IF [MAC] @ [facility])  24.8  [If yes, BMI OVER 40-EXTENDED PREP]      **(Sedation review/consideration needed)**  Do you have a legal guardian or Medical Power of    and/or are you able to give consent for your medical care?     Can give consent    5. Have you had a positive covid test in the last 90 days?   n -     6.  Are you currently on dialysis?   n [ If yes, G-PREP & HOSPITAL setting ONLY]     7.  Do you have chronic kidney disease?  n [ If yes, G-PREP ]    8.   Do you have a diagnosis of diabetes?   n   [ If yes, G-PREP ]    9.  On a regular basis do you go 3-5 days between bowel movements?   n   [ If yes, EXTENDED PREP]    10.  Are you taking any prescription pain medications on a routine schedule?    n -  [ If yes, EXTENDED PREP] [If yes, MAC]      11.   Do you have any chemical dependencies such as alcohol, street drugs, or methadone?    n [If yes, MAC]    12.   Do you have any history of post-traumatic stress syndrome, severe anxiety or history of psychosis?    n   [If yes, MAC]    13.  [FEMALES] Are you currently pregnant? n/a    If yes, how many weeks?       Respiratory/Heart Screening:  [If yes to any of the following HOSPITAL setting only]     14. Do you have Pulmonary Hypertension [Lungs]?   n       15. Do you have UNCONTROLLED asthma?   n     16.  Do you use daily home oxygen?  n      17. Do you have mod to severe Obstructive Sleep Apnea?         (OKAY @ [facility]  [facility]  SH  PH  RI  MG - if pt is not on OXYGEN)  n      18.   Have you had a heart or lung transplant?   n      19.   Have you had a stroke or Transient ischemic attack (TIA - aka  mini stroke ) within 6 months?  (If  yes, please review exclusion criteria)  n     20.   In the past 6 months, have you had any heart related issues including cardiomyopathy or heart attack?   n           If yes, did it require cardiac stenting or other implantable device?         21.   Do you have any implantable devices in your body (pacemaker, defib, LVAD)? (If yes, please review exclusion criteria)  n   22.  Do you take the medication Phentermine?     Yes-> Hold for 7 days before procedure.  Please consult your prescribing provider if you have questions about holding this medication.     No-> Continue to next question.    23. Do you take nitroglycerin?   n           If yes, how often?   (if yes, HOSPITAL setting ONLY)    24.  Are you currently taking any blood thinners?    [If yes, INFORM patient to follw up w/ ORDERING PROVIDER FOR BRIDGING INSTRUCTIONS]     n    25.   Do you transfer independently?                (If NO, please HOSPITAL setting ONLY)  y    26.   Preferred LOCAL Pharmacy for Pre Prescription:         [id number] [place], [place] - [address]      Scheduling Details  (Please ask for phone number if not scheduled by patient)      Caller : Ignacio Sarmiento    Date of Procedure: 9/22  Surgeon: Bharath  Location:         Sedation Type: MODERATE l   Conscious Sedation- Needs  for 6 hours after the procedure  MAC/General-Needs  for 24 hours after procedure    n :[Pre-op Required] at [facility]  SH  MG and OR for MAC sedation   (advise patient they will need a pre-op WITH IN 30 DAYS of procedure date)     Type of Procedure Scheduled:   Lower Endoscopy [Colonoscopy]    Which Colonoscopy Prep was Sent?:   [facility] - mag citrate recall      [facility] PATIENTS & [facility] PATIENTS NEEDS EXTENDED PREP       Informed patient they will need an adult  y  Cannot take any type of public or medical transportation alone    Pre-Procedure Covid test [facility] or Externally: home test   **INFORMED OF HOME TESTING & LAB OPTION**        Confirmed Nurse will call to complete assessment y    Additional comments:

## 2022-09-06 NOTE — TELEPHONE ENCOUNTER
Patient had a positive cologuard test and referral placed for a colonoscopy.   Left a voicemail for Ignacio with the number to call to schedule.

## 2022-09-14 RX ORDER — BISACODYL 5 MG
TABLET, DELAYED RELEASE (ENTERIC COATED) ORAL
Qty: 4 TABLET | Refills: 0 | Status: SHIPPED | OUTPATIENT
Start: 2022-09-14 | End: 2023-11-08

## 2022-09-22 ENCOUNTER — HOSPITAL ENCOUNTER (OUTPATIENT)
Facility: CLINIC | Age: 72
Discharge: HOME | End: 2022-09-22
Attending: INTERNAL MEDICINE | Admitting: INTERNAL MEDICINE
Payer: COMMERCIAL

## 2022-09-22 VITALS
OXYGEN SATURATION: 97 % | SYSTOLIC BLOOD PRESSURE: 135 MMHG | TEMPERATURE: 97.6 F | DIASTOLIC BLOOD PRESSURE: 82 MMHG | HEIGHT: 70 IN | HEART RATE: 56 BPM | WEIGHT: 166.2 LBS | RESPIRATION RATE: 6 BRPM | BODY MASS INDEX: 23.79 KG/M2

## 2022-09-22 DIAGNOSIS — Z12.11 SPECIAL SCREENING FOR MALIGNANT NEOPLASMS, COLON: Primary | ICD-10-CM

## 2022-09-22 LAB — COLONOSCOPY: NORMAL

## 2022-09-22 PROCEDURE — G0500 MOD SEDAT ENDO SERVICE >5YRS: HCPCS | Performed by: INTERNAL MEDICINE

## 2022-09-22 PROCEDURE — 88305 TISSUE EXAM BY PATHOLOGIST: CPT | Mod: TC | Performed by: INTERNAL MEDICINE

## 2022-09-22 PROCEDURE — 45385 COLONOSCOPY W/LESION REMOVAL: CPT | Mod: PT | Performed by: INTERNAL MEDICINE

## 2022-09-22 PROCEDURE — 45382 COLONOSCOPY W/CONTROL BLEED: CPT | Performed by: INTERNAL MEDICINE

## 2022-09-22 PROCEDURE — 250N000011 HC RX IP 250 OP 636: Performed by: INTERNAL MEDICINE

## 2022-09-22 RX ORDER — EPINEPHRINE 1 MG/ML
0.1 INJECTION, SOLUTION INTRAMUSCULAR; SUBCUTANEOUS
Status: DISCONTINUED | OUTPATIENT
Start: 2022-09-22 | End: 2022-09-22 | Stop reason: HOSPADM

## 2022-09-22 RX ORDER — FLUMAZENIL 0.1 MG/ML
0.2 INJECTION, SOLUTION INTRAVENOUS
Status: DISCONTINUED | OUTPATIENT
Start: 2022-09-22 | End: 2022-09-22 | Stop reason: HOSPADM

## 2022-09-22 RX ORDER — ONDANSETRON 2 MG/ML
4 INJECTION INTRAMUSCULAR; INTRAVENOUS EVERY 6 HOURS PRN
Status: DISCONTINUED | OUTPATIENT
Start: 2022-09-22 | End: 2022-09-22 | Stop reason: HOSPADM

## 2022-09-22 RX ORDER — PROCHLORPERAZINE MALEATE 5 MG
5 TABLET ORAL EVERY 6 HOURS PRN
Status: DISCONTINUED | OUTPATIENT
Start: 2022-09-22 | End: 2022-09-22 | Stop reason: HOSPADM

## 2022-09-22 RX ORDER — SIMETHICONE 40MG/0.6ML
133 SUSPENSION, DROPS(FINAL DOSAGE FORM)(ML) ORAL
Status: DISCONTINUED | OUTPATIENT
Start: 2022-09-22 | End: 2022-09-22 | Stop reason: HOSPADM

## 2022-09-22 RX ORDER — DIPHENHYDRAMINE HYDROCHLORIDE 50 MG/ML
25-50 INJECTION INTRAMUSCULAR; INTRAVENOUS
Status: DISCONTINUED | OUTPATIENT
Start: 2022-09-22 | End: 2022-09-22 | Stop reason: HOSPADM

## 2022-09-22 RX ORDER — NALOXONE HYDROCHLORIDE 0.4 MG/ML
0.2 INJECTION, SOLUTION INTRAMUSCULAR; INTRAVENOUS; SUBCUTANEOUS
Status: DISCONTINUED | OUTPATIENT
Start: 2022-09-22 | End: 2022-09-22 | Stop reason: HOSPADM

## 2022-09-22 RX ORDER — ONDANSETRON 2 MG/ML
4 INJECTION INTRAMUSCULAR; INTRAVENOUS
Status: DISCONTINUED | OUTPATIENT
Start: 2022-09-22 | End: 2022-09-22 | Stop reason: HOSPADM

## 2022-09-22 RX ORDER — LIDOCAINE 40 MG/G
CREAM TOPICAL
Status: DISCONTINUED | OUTPATIENT
Start: 2022-09-22 | End: 2022-09-22 | Stop reason: HOSPADM

## 2022-09-22 RX ORDER — ATROPINE SULFATE 0.1 MG/ML
1 INJECTION INTRAVENOUS
Status: DISCONTINUED | OUTPATIENT
Start: 2022-09-22 | End: 2022-09-22 | Stop reason: HOSPADM

## 2022-09-22 RX ORDER — FENTANYL CITRATE 0.05 MG/ML
50-100 INJECTION, SOLUTION INTRAMUSCULAR; INTRAVENOUS EVERY 5 MIN PRN
Status: DISCONTINUED | OUTPATIENT
Start: 2022-09-22 | End: 2022-09-22 | Stop reason: HOSPADM

## 2022-09-22 RX ORDER — ONDANSETRON 4 MG/1
4 TABLET, ORALLY DISINTEGRATING ORAL EVERY 6 HOURS PRN
Status: DISCONTINUED | OUTPATIENT
Start: 2022-09-22 | End: 2022-09-22 | Stop reason: HOSPADM

## 2022-09-22 RX ORDER — NALOXONE HYDROCHLORIDE 0.4 MG/ML
0.4 INJECTION, SOLUTION INTRAMUSCULAR; INTRAVENOUS; SUBCUTANEOUS
Status: DISCONTINUED | OUTPATIENT
Start: 2022-09-22 | End: 2022-09-22 | Stop reason: HOSPADM

## 2022-09-22 RX ADMIN — FENTANYL CITRATE 100 MCG: 50 INJECTION INTRAMUSCULAR; INTRAVENOUS at 10:21

## 2022-09-22 RX ADMIN — MIDAZOLAM HYDROCHLORIDE 2 MG: 1 INJECTION, SOLUTION INTRAMUSCULAR; INTRAVENOUS at 10:20

## 2022-09-22 ASSESSMENT — ACTIVITIES OF DAILY LIVING (ADL): ADLS_ACUITY_SCORE: 35

## 2022-09-22 NOTE — H&P
Pre-Endoscopy History and Physical     Ignacio Sarmiento MRN# [id number]   YOB: 1950 Age: 72 year old     Date of Procedure: 9/22/2022  Primary care provider: Nieves Gaston  Type of Endoscopy: Colonoscopy with possible biopsy, possible polypectomy  Reason for Procedure: positive cologuard  Type of Anesthesia Anticipated: Conscious Sedation    HPI:    Ignacio is a 72 year old male who will be undergoing the above procedure.      A history and physical has been performed. The patient's medications and allergies have been reviewed. The risks and benefits of the procedure and the sedation options and risks were discussed with the patient.  All questions were answered and informed consent was obtained.      He denies a personal or family history of anesthesia complications or bleeding disorders.     Patient Active Problem List   Diagnosis     Seborrheic dermatitis     History of skin cancer     Midline thoracic back pain, unspecified chronicity     Dyspepsia     Midline low back pain without sciatica     Hyperlipidemia LDL goal <160     Primary osteoarthritis of ankle, unspecified laterality     Syncope     Systolic hypertension     Mobitz I     Paroxysmal supraventricular tachycardia (H)     Malignant neoplasm of head, face, and neck (H)        Past Medical History:   Diagnosis Date     Cataracts, bilateral      Dyspepsia 10/18/2018     Elevated blood pressure reading without diagnosis of hypertension 10/18/2018     History of skin cancer 10/18/2018     Hyperlipidemia LDL goal <160 11/5/2018     Midline low back pain without sciatica 10/19/2018     Midline thoracic back pain, unspecified chronicity 10/18/2018     Mobitz I 8/12/2019     Primary osteoarthritis of ankle, unspecified laterality 11/9/2018     Seborrheic dermatitis 10/18/2018     Syncope 7/1/2019     Systolic hypertension 7/1/2019        Past Surgical History:   Procedure Laterality Date     ARTHROSCOPY KNEE Right      ARTHROSCOPY KNEE  "WITH MENISCECTOMY Left     open     COLONOSCOPY         Social History     Tobacco Use     Smoking status: Former Smoker     Packs/day: 0.00     Quit date: 1978     Years since quittin.7     Smokeless tobacco: Never Used   Substance Use Topics     Alcohol use: Yes     Comment: 2 berrs daily       No family history on file.    Prior to Admission medications    Medication Sig Start Date End Date Taking? Authorizing Provider   bisacodyl (DULCOLAX) 5 MG EC tablet Take 2 tablets at 3 pm the day before your procedure. If your procedure is before 11 am, take 2 additional tablets at 8 pm. If your procedure is after 11 am, take 2 additional tablets at 6 am. For additional instructions refer to your colonoscopy prep instructions. 22  Yes Hector Sinha MD   polyethylene glycol (GOLYTELY) 236 g suspension The night before the exam at 6 pm drink an 8-ounce glass every 15 minutes until the jug is half empty. If you arrive before 11 AM: Drink the other half of the Golytely jug at 11 PM night before procedure. If you arrive after 11 AM: Drink the other half of the Golytely jug at 6 AM day of procedure. For additional instructions refer to your colonoscopy prep instructions. 22  Yes Hector Sinha MD       No Known Allergies     REVIEW OF SYSTEMS:   5 point ROS negative except as noted above in HPI, including Gen., Resp., CV, GI &  system review.    PHYSICAL EXAM:   There were no vitals taken for this visit. Estimated body mass index is 24.67 kg/m  as calculated from the following:    Height as of 22: 1.765 m (5' 9.5\").    Weight as of 22: 76.9 kg (169 lb 8 oz).   GENERAL APPEARANCE: alert, and oriented  MENTAL STATUS: alert  AIRWAY EXAM: Mallampatti Class I (visualization of the soft palate, fauces, uvula, anterior and posterior pillars)  RESP: lungs clear to auscultation - no rales, rhonchi or wheezes  CV: regular rates and rhythm  DIAGNOSTICS:    Not indicated    IMPRESSION   ASA Class 2 - " Mild systemic disease    PLAN:   Plan for Colonoscopy with possible biopsy, possible polypectomy. We discussed the risks, benefits and alternatives and the patient wished to proceed.    The above has been forwarded to the consulting provider.      Signed Electronically by: Hector Sinha MD  September 22, 2022

## 2022-09-22 NOTE — LETTER
[facility] ENDOSCOPY [place]    [address] [place] [address]  Phone: [phone]  Fax: [phone]       September 7, 2022                      Ignacio Traore Guillermina  [address] [place] [address]          Dear Ignacio Sarmiento,        Thank you for choosing [facility]. You are scheduled for the following service(s).   Please be aware that coverage of these services is subject to the terms and limitations of your health insurance plan.  Call member services at your health plan with any benefit or coverage questions.    Scheduled Endoscopy Procedure(s): Colonoscopy     Date: 09/22/22  Scheduled MD: Dr. Hector Sinha          Arrival Time:   09:30 AM  *Enter and check in at the Main Hospital Entrance  Procedure Time:  10:15 AM       Location:   [facility]        Endoscopy Department, First Floor *         [address] [place], [place] [address] [phone] () or [phone] to reschedule    STANDARD Golytely (Colyte, Nulytely)  Prep Instructions for your Colonoscopy  Please read these instructions carefully at least 7 days prior to your colonoscopy procedure. Be sure to follow all directions completely. The inside of your colon must be clean to allow for a complete examination for the presence of any growths, polyps, and/or abnormalities, as well as their biopsy or removal. A number of tips are included in order to make this part of the procedure as comfortable as possible.    Getting ready:     A nurse will call you approximately 1 week before your exam to prescribe your bowel prep and review the prep instructions with you.    You must arrange for an adult to drive you home after your exam. Your colonoscopy cannot be done unless you have a ride. If you need to use public transportation, someone must ride with you and stay with you for a minimum of 6-24 hours.    Check with your insurance company to be  sure they will cover this exam.    7 days before the exam:    Talk to your doctor:  If you take blood-thinners (such as Coumadin, Plavix, Xarelto), your prescription or schedule may need to change before the test.    Stop taking fiber supplements, multi-vitamins with iron, and medicines that contain iron.    Continue taking prescribed aspirin; talk to your prescribing doctor with any concerns.    Stop eating corn, nuts and foods with seeds.  These can stay in the colon for days.    If you have diabetes:  Ask to have your exam early in the morning.  Also, ask your doctor if you should change your diet or medicines.    3 days before the exam:    Begin a low-fiber diet: No raw fruits or vegetables, whole wheat, seeds, nuts, popcorn or other high-fiber foods (see list on page). No binding agents: (bran, Metamucil, Fibercon) and no Olestra (a fat substitute).    One day before the exam:    You can have a light, low-fiber breakfast. But drink only clear liquids after 9 a.m. (see list below). Drink at least 8 to 10 full glasses of clear liquids during the day.     Fill the jug that contains the Golytely powder with warm water. Cover and shake until well mixed. Use a full gallon of water. Chill for 3 hours, but do not add ice.    You will start drinking half of the Golytely solution at 6 p.m. You should drink the other half about 6 hours before your exam. So, the timing of Step 2 will depend on your exam time.     After you start drinking the solution, stay near a toilet. You may have watery stools (diarrhea), mild cramping, bloating , and nausea.     Step One:  o At 3 p.m., take 2 tablets of Dulcolax (bisacodyl).  o At 6 p.m. start drinking the Golytely solution. Drink an 8-ounce glass every 15 minutes until the jug is half empty. Drink each glass quickly.     Step Two:   If you arrive before 11 AM:  At 11 p.m. on the day before your exam:    Take 2 Dulcolax (Bisacodyl) tablets.     Start drinking the other half of the  Golytely jug. Drink one 8-ounce glass every 15 minutes until the jug is empty. Drink each glass quickly.  If you arrive after 11 AM:  At 6 AM on the day of the exam:    Take 2 tablets of Dulcolax (Bisacodyl).     Drink the other half of the Golytyel jug.     Drink one 8-ounce glass every 15 minutes until the jug is empty.     Drink each glass quickly.     You should finish the prep 4 hours before the exam.      Day of exam:      You may drink clear liquids only up until 4 hours before your exam.    Do not drink anything 4 hours before your exam, not even water. (If you must take medicine, you can take it with a sip of water.)     Do not chew or swallow anything including water or gum for at least 4 hours before your exam. If you do, we may cancel the exam for your safety.     Do not take diabetes medicine by mouth until after your exam.    If you have asthma, bring your inhaler.    Arrive with an adult who will take you home after your test. The medicine used will make you sleepy. If you don't have someone to take you home, we will cancel your test.       CLEAR LIQUIDS   You may have:    Water, tea, coffee (no milk or cream)    Soda pop, Gatorade (not red or purple)    Jell-O, Popsicles (no milk or fruit pieces - not red or purple)    Fat-free soup broth or bouillon    Plain hard candy, such as clear life savers (not red or purple)    Clear juices and fruit-flavored drinks, such as apple juice, white grape juice, Hi-C, and Vishal-Aid (not red or purple)   Do not have:    Milk or milk products such as ice cream, malts or shakes, or coffee creamer    Red or purple drinks of any kind such as cranberry juice or grape juice. Avoid red or purple Jell-O, Popsicles, Vishal-Aid, sorbet, sherbet and candy    Juices with pulp such as orange, grapefruit, pineapple or tomato juice    Cream soups of any kind    Alcohol and beer    Protein drinks or protein powder     LOW FIBER DIET   You may have:      Starches: White bread, rolls,  biscuits, croissants, Yasmin toast, white flour tortillas, waffles, pancakes, Indian toast; white rice, noodles, pasta, macaroni; cooked and peeled potatoes; plain crackers, saltines; cooked farina or cream of rice; puffed rice, corn flakes, Rice Krispies, Special K     Vegetables: tender cooked and canned, vegetable broths    Fruits and fruit juices: Strained fruit juice, canned fruit without seeds or skin (not pineapple), applesauce, pear sauce, ripe bananas, melons (not watermelon)     Milk products: Milk (plain or flavored), cheese, cottage cheese, yogurt (no berries), custard, ice cream      Proteins: Tender, well-cooked ground beef, lamb, veal, ham, pork, chicken, turkey, fish or organ meats; eggs; creamy peanut butter     Fats and condiments:  Margarine, butter, oils, mayonnaise, sour cream, salad dressing, plain gravy; spices, cooked herbs; sugar, clear jelly, honey, syrup     Snacks, sweets and drinks: Pretzels, hard candy; plain cakes and cookies (no nuts or seeds); gelatin, plain pudding, sherbet, Popsicles; coffee, tea, carbonated ( fizzy ) drinks Do not have:      Starches: Breads or rolls that contain nuts, seeds or fruit; whole wheat or whole grain breads that contain more than 1 gram of fiber per slice; cornbread; corn or whole wheat tortillas; potatoes with skin; brown rice, wild rice, kasha (buckwheat), and oatmeal     Vegetables: Any raw or steamed vegetables; vegetables with seeds; corn in any form     Fruits and fruit juices: Prunes, prune juice, raisins and other dried fruits, berries and other fruits with seeds, canned pineapple juices with pulp such as orange, grapefruit, pineapple or tomato juice    Milk products: Any yogurt with nuts, seeds or berries     Proteins: Tough, fibrous meats with gristle; cooked dried beans, peas or lentils; crunchy peanut butter    Fats and condiments: Pickles, olives, relish, horseradish; jam, marmalade, preserves     Snacks, sweets and drinks: Popcorn, nuts,  seeds, granola, coconut, candies made with nuts or seeds; all desserts that contain nuts, seeds, raisins and other dried fruits, coconut, whole grains or bran.        FAQ:      How do you know if your colon is cleaned out?   o After completing the bowel prep, your bowel movements should be all liquid and yellow. Your bowel movements will look similar to urine in the toilet. If there are pieces of stool (poop) in the toilet, or if you can't see to the bottom of the toilet, please call our office for advice. Call [phone] and ask to speak with a nurse.     Why do you need a responsible  to take you home and stay with you?  o We require a responsible adult to take you home for your safety. The sedation medicines used to relax you during the procedure can impair your judgement and reaction time, make you forgetful and possible a little unsteady. Do not drive, make any important decisions, or sign any legal documents for 24 hours after your procedure.     It is normal to feel bloated and gassy after your procedure. Walking will help move the air through your colon. You can take non-aspirin pain relievers that contain acetaminophen (Tylenol).     When can you eat after your procedure?  o You may resume your normal diet when you feel ready, unless advised otherwise by the doctor performing your procedure. Do not drink alcohol for 24 hours after your procedure.     You many resume normal activities (work, exercise, etc.) after 24 hours.     When will you get test results?  o You should have your procedure results and any lab results (if applicable) by letter, [employer] message, or phone call within 2 weeks. If you have any questions, please call the doctor that referred you for the procedure.       Thank you for choosing  [facility], for your procedure. If you are sent a survey regarding your care, please take the time to complete the questionnaire. We value your feedback!             Updated: 6/22/2022

## 2022-09-23 LAB
PATH REPORT.COMMENTS IMP SPEC: NORMAL
PATH REPORT.COMMENTS IMP SPEC: NORMAL
PATH REPORT.FINAL DX SPEC: NORMAL
PATH REPORT.GROSS SPEC: NORMAL
PATH REPORT.MICROSCOPIC SPEC OTHER STN: NORMAL
PATH REPORT.RELEVANT HX SPEC: NORMAL
PHOTO IMAGE: NORMAL

## 2022-09-23 PROCEDURE — 88305 TISSUE EXAM BY PATHOLOGIST: CPT | Mod: 26 | Performed by: PATHOLOGY

## 2022-10-31 ENCOUNTER — OFFICE VISIT (OUTPATIENT)
Dept: DERMATOLOGY | Facility: CLINIC | Age: 72
End: 2022-10-31
Payer: COMMERCIAL

## 2022-10-31 DIAGNOSIS — L81.4 LENTIGINES: Primary | ICD-10-CM

## 2022-10-31 DIAGNOSIS — Z85.828 HISTORY OF BASAL CELL CARCINOMA: ICD-10-CM

## 2022-10-31 DIAGNOSIS — L82.1 SEBORRHEIC KERATOSES: ICD-10-CM

## 2022-10-31 DIAGNOSIS — L82.0 INFLAMED SEBORRHEIC KERATOSIS: ICD-10-CM

## 2022-10-31 PROCEDURE — 17110 DESTRUCTION B9 LES UP TO 14: CPT | Performed by: STUDENT IN AN ORGANIZED HEALTH CARE EDUCATION/TRAINING PROGRAM

## 2022-10-31 PROCEDURE — 99203 OFFICE O/P NEW LOW 30 MIN: CPT | Mod: 25 | Performed by: STUDENT IN AN ORGANIZED HEALTH CARE EDUCATION/TRAINING PROGRAM

## 2022-10-31 NOTE — PROGRESS NOTES
[facility] Dermatology Note    Encounter Date: Oct 31, 2022    Dermatology Problem List:  - BCC x3 nose  - BCC R cheek    Major PMHx  -   ______________________________________    Impression/Plan:  Ignacio was seen today for skin check.    Diagnoses and all orders for this visit:    Lentigines  Seborrheic keratoses  - benign    Inflamed seborrheic keratosis  -     DESTRUCT BENIGN LESION, UP TO 14  - ln2 x1 L chest    History of basal cell carcinoma  - No obvious evidence of recurrence at site of previous malignancy  - discussed sunprotective behaviors, clothing, and the use of sunscreen        Cryotherapy procedure note: After verbal consent and discussion of risks and benefits including but no limited to dyspigmentation/scar, blister, and pain, 1 isk l chest was(were) treated with 1-2mm freeze border for 2 cycles with liquid nitrogen. Post cryotherapy instructions were provided.       Follow-up in 1 year.       Staff Involved:  Staff Only    Michel Paniagua MD   of Dermatology  Department of Dermatology  [facility]      CC:   Chief Complaint   Patient presents with     Skin Check       History of Present Illness:  Mr. Ignacio Sarmiento is a 72 year old male who presents as a new patient.    History of basal cells on the nose, has some itchy spots on his chest he like examined    Labs:      Physical exam:  Vitals: There were no vitals taken for this visit.  GEN: well developed, well-nourished, in no acute distress, in a pleasant mood.     SKIN: Calles phototype 2  - Full skin, which includes the head/face, both arms, chest, back, abdomen,both legs, genitalia and/or groin buttocks, digits and/or nails, was examined.  - Stuck on brown papules on trunk and extremities   - Flat brown macules and patches in a sun exposed areas on face and extremities  - No obvious evidence of recurrence at site of previous malignancy  - No other lesions of  concern on areas examined.     Past Medical History:   Past Medical History:   Diagnosis Date     Cataracts, bilateral      Dyspepsia 10/18/2018     Elevated blood pressure reading without diagnosis of hypertension 10/18/2018     History of skin cancer 10/18/2018     Hyperlipidemia LDL goal <160 11/5/2018     Midline low back pain without sciatica 10/19/2018     Midline thoracic back pain, unspecified chronicity 10/18/2018     Mobitz I 8/12/2019     Primary osteoarthritis of ankle, unspecified laterality 11/9/2018     Seborrheic dermatitis 10/18/2018     Syncope 7/1/2019     Systolic hypertension 7/1/2019     Past Surgical History:   Procedure Laterality Date     ARTHROSCOPY KNEE Right      ARTHROSCOPY KNEE WITH MENISCECTOMY Left     open     COLONOSCOPY       COLONOSCOPY Left 9/22/2022    Procedure: COLONOSCOPY, FLEXIBLE, WITH POLYPECTOMIES REMOVAL USING COLD SNARE;  Surgeon: Hector Sinha MD;  Location:  GI       Social History:   reports that he quit smoking about 44 years ago. His smoking use included cigarettes. He has never used smokeless tobacco. He reports current alcohol use. He reports that he does not currently use drugs after having used the following drugs: Marijuana.    Family History:  Family History   Problem Relation Age of Onset     Colon Cancer No family hx of        Medications:  Current Outpatient Medications   Medication Sig Dispense Refill     bisacodyl (DULCOLAX) 5 MG EC tablet Take 2 tablets at 3 pm the day before your procedure. If your procedure is before 11 am, take 2 additional tablets at 8 pm. If your procedure is after 11 am, take 2 additional tablets at 6 am. For additional instructions refer to your colonoscopy prep instructions. (Patient not taking: Reported on 10/31/2022) 4 tablet 0     polyethylene glycol (GOLYTELY) 236 g suspension The night before the exam at 6 pm drink an 8-ounce glass every 15 minutes until the jug is half empty. If you arrive before 11 AM: Drink the  other half of the Golytely jug at 11 PM night before procedure. If you arrive after 11 AM: Drink the other half of the Golytely jug at 6 AM day of procedure. For additional instructions refer to your colonoscopy prep instructions. (Patient not taking: Reported on 10/31/2022) 4000 mL 0     No Known Allergies

## 2022-10-31 NOTE — LETTER
10/31/2022         RE: Ignacio Sarmiento  [address]  [facility] [facility] [place] [address]        Dear Colleague,    Thank you for referring your patient, Ignacio Sarmiento, to the [facility]. Please see a copy of my visit note below.    [facility] Dermatology Note    Encounter Date: Oct 31, 2022    Dermatology Problem List:  - BCC x3 nose  - BCC R cheek    Major PMHx  -   ______________________________________    Impression/Plan:  Ignacio was seen today for skin check.    Diagnoses and all orders for this visit:    Lentigines  Seborrheic keratoses  - benign    Inflamed seborrheic keratosis  -     DESTRUCT BENIGN LESION, UP TO 14  - ln2 x1 L chest    History of basal cell carcinoma  - No obvious evidence of recurrence at site of previous malignancy  - discussed sunprotective behaviors, clothing, and the use of sunscreen        Cryotherapy procedure note: After verbal consent and discussion of risks and benefits including but no limited to dyspigmentation/scar, blister, and pain, 1 isk l chest was(were) treated with 1-2mm freeze border for 2 cycles with liquid nitrogen. Post cryotherapy instructions were provided.       Follow-up in 1 year.       Staff Involved:  Staff Only    Michel Paniagua MD   of Dermatology  Department of Dermatology  [facility]      CC:   Chief Complaint   Patient presents with     Skin Check       History of Present Illness:  Mr. Ignacio Sarmiento is a 72 year old male who presents as a new patient.    History of basal cells on the nose, has some itchy spots on his chest he like examined    Labs:      Physical exam:  Vitals: There were no vitals taken for this visit.  GEN: well developed, well-nourished, in no acute distress, in a pleasant mood.     SKIN: Calles phototype 2  - Full skin, which includes the head/face, both arms, chest, back, abdomen,both legs, genitalia and/or  groin buttocks, digits and/or nails, was examined.  - Stuck on brown papules on trunk and extremities   - Flat brown macules and patches in a sun exposed areas on face and extremities  - No obvious evidence of recurrence at site of previous malignancy  - No other lesions of concern on areas examined.     Past Medical History:   Past Medical History:   Diagnosis Date     Cataracts, bilateral      Dyspepsia 10/18/2018     Elevated blood pressure reading without diagnosis of hypertension 10/18/2018     History of skin cancer 10/18/2018     Hyperlipidemia LDL goal <160 11/5/2018     Midline low back pain without sciatica 10/19/2018     Midline thoracic back pain, unspecified chronicity 10/18/2018     Mobitz I 8/12/2019     Primary osteoarthritis of ankle, unspecified laterality 11/9/2018     Seborrheic dermatitis 10/18/2018     Syncope 7/1/2019     Systolic hypertension 7/1/2019     Past Surgical History:   Procedure Laterality Date     ARTHROSCOPY KNEE Right      ARTHROSCOPY KNEE WITH MENISCECTOMY Left     open     COLONOSCOPY       COLONOSCOPY Left 9/22/2022    Procedure: COLONOSCOPY, FLEXIBLE, WITH POLYPECTOMIES REMOVAL USING COLD SNARE;  Surgeon: Hector Sinha MD;  Location:  GI       Social History:   reports that he quit smoking about 44 years ago. His smoking use included cigarettes. He has never used smokeless tobacco. He reports current alcohol use. He reports that he does not currently use drugs after having used the following drugs: Marijuana.    Family History:  Family History   Problem Relation Age of Onset     Colon Cancer No family hx of        Medications:  Current Outpatient Medications   Medication Sig Dispense Refill     bisacodyl (DULCOLAX) 5 MG EC tablet Take 2 tablets at 3 pm the day before your procedure. If your procedure is before 11 am, take 2 additional tablets at 8 pm. If your procedure is after 11 am, take 2 additional tablets at 6 am. For additional instructions refer to your  colonoscopy prep instructions. (Patient not taking: Reported on 10/31/2022) 4 tablet 0     polyethylene glycol (GOLYTELY) 236 g suspension The night before the exam at 6 pm drink an 8-ounce glass every 15 minutes until the jug is half empty. If you arrive before 11 AM: Drink the other half of the Golytely jug at 11 PM night before procedure. If you arrive after 11 AM: Drink the other half of the Golytely jug at 6 AM day of procedure. For additional instructions refer to your colonoscopy prep instructions. (Patient not taking: Reported on 10/31/2022) 4000 mL 0     No Known Allergies                Again, thank you for allowing me to participate in the care of your patient.        Sincerely,        Michel Paniagua MD

## 2023-04-10 ENCOUNTER — OFFICE VISIT (OUTPATIENT)
Dept: DERMATOLOGY | Facility: CLINIC | Age: 73
End: 2023-04-10
Payer: COMMERCIAL

## 2023-04-10 DIAGNOSIS — L82.0 INFLAMED SEBORRHEIC KERATOSIS: Primary | ICD-10-CM

## 2023-04-10 DIAGNOSIS — Z85.828 HISTORY OF BASAL CELL CARCINOMA: ICD-10-CM

## 2023-04-10 DIAGNOSIS — L57.8 ACTINIC SKIN DAMAGE: ICD-10-CM

## 2023-04-10 PROCEDURE — 99213 OFFICE O/P EST LOW 20 MIN: CPT | Mod: 25 | Performed by: STUDENT IN AN ORGANIZED HEALTH CARE EDUCATION/TRAINING PROGRAM

## 2023-04-10 PROCEDURE — 17110 DESTRUCTION B9 LES UP TO 14: CPT | Performed by: STUDENT IN AN ORGANIZED HEALTH CARE EDUCATION/TRAINING PROGRAM

## 2023-04-10 NOTE — PROGRESS NOTES
[facility] Dermatology Note    Encounter Date: Apr 10, 2023    Dermatology Problem List:  #Hx NMSC last fall of 2021  - BCC x3 nose  - BCC R cheek    Major PMHx  -   ______________________________________    Impression/Plan:  Ignacio was seen today for derm problem.    Diagnoses and all orders for this visit:    Inflamed seborrheic keratosis  -     DESTRUCT BENIGN LESION, UP TO 14  - x4 L chin and neck     History of basal cell carcinoma  Actinic skin damage  - No obvious evidence of recurrence at site of previous malignancy  - discussed sunprotective behaviors, clothing, and the use of sunscreen        Cryotherapy procedure note: After verbal consent and discussion of risks and benefits including but no limited to dyspigmentation/scar, blister, and pain, 4 ISK L chin and neck was(were) treated with 1-2mm freeze border for 2 cycles with liquid nitrogen. Post cryotherapy instructions were provided.       Follow-up in 6 mo .       Staff Involved:  Staff Only    Michel Paniagua MD   of Dermatology  Department of Dermatology  [facility]      CC:   Chief Complaint   Patient presents with     Derm Problem     Fbscs        History of Present Illness:  Mr. Ignacio Sarmiento is a 72 year old male who presents as a return patient.    Spot on neck that is bothering him. Additionally some stuck on brown spots on L chin     Labs:      Physical exam:  Vitals: There were no vitals taken for this visit.  GEN: well developed, well-nourished, in no acute distress, in a pleasant mood.     SKIN: Calles phototype 2  - Full skin, which includes the head/face, both arms, chest, back, abdomen,both legs, genitalia and/or groin buttocks, digits and/or nails, was examined.  - No obvious evidence of recurrence at site of previous malignancy  - Stuck on brown papules on trunk and extremities   - Flat brown macules and patches in a sun exposed areas on face and  extremities  - No other lesions of concern on areas examined.     Past Medical History:   Past Medical History:   Diagnosis Date     Cataracts, bilateral      Dyspepsia 10/18/2018     Elevated blood pressure reading without diagnosis of hypertension 10/18/2018     History of skin cancer 10/18/2018     Hyperlipidemia LDL goal <160 11/05/2018     Midline low back pain without sciatica 10/19/2018     Midline thoracic back pain, unspecified chronicity 10/18/2018     Mobitz I 08/12/2019     Primary osteoarthritis of ankle, unspecified laterality 11/09/2018     Seborrheic dermatitis 10/18/2018     Syncope 07/01/2019     Systolic hypertension 07/01/2019     Past Surgical History:   Procedure Laterality Date     ARTHROSCOPY KNEE Right      ARTHROSCOPY KNEE WITH MENISCECTOMY Left     open     COLONOSCOPY       COLONOSCOPY Left 9/22/2022    Procedure: COLONOSCOPY, FLEXIBLE, WITH POLYPECTOMIES REMOVAL USING COLD SNARE;  Surgeon: Hector Sinha MD;  Location:  GI       Social History:   reports that he quit smoking about 45 years ago. His smoking use included cigarettes. He has never used smokeless tobacco. He reports current alcohol use. He reports that he does not currently use drugs after having used the following drugs: Marijuana.    Family History:  Family History   Problem Relation Age of Onset     Colon Cancer No family hx of        Medications:  Current Outpatient Medications   Medication Sig Dispense Refill     bisacodyl (DULCOLAX) 5 MG EC tablet Take 2 tablets at 3 pm the day before your procedure. If your procedure is before 11 am, take 2 additional tablets at 8 pm. If your procedure is after 11 am, take 2 additional tablets at 6 am. For additional instructions refer to your colonoscopy prep instructions. (Patient not taking: Reported on 10/31/2022) 4 tablet 0     polyethylene glycol (GOLYTELY) 236 g suspension The night before the exam at 6 pm drink an 8-ounce glass every 15 minutes until the jug is half  empty. If you arrive before 11 AM: Drink the other half of the Golytely jug at 11 PM night before procedure. If you arrive after 11 AM: Drink the other half of the Golytely jug at 6 AM day of procedure. For additional instructions refer to your colonoscopy prep instructions. (Patient not taking: Reported on 10/31/2022) 4000 mL 0     No Known Allergies

## 2023-04-10 NOTE — LETTER
4/10/2023         RE: Ignacio Sarmiento  [address]  [facility] [address]        Dear Colleague,    Thank you for referring your patient, Ignacio Sarmiento, to the [facility]. Please see a copy of my visit note below.    [facility] Dermatology Note    Encounter Date: Apr 10, 2023    Dermatology Problem List:  #Hx NMSC last fall of 2021  - BCC x3 nose  - BCC R cheek    Major PMHx  -   ______________________________________    Impression/Plan:  Ignacio was seen today for derm problem.    Diagnoses and all orders for this visit:    Inflamed seborrheic keratosis  -     DESTRUCT BENIGN LESION, UP TO 14  - x4 L chin and neck     History of basal cell carcinoma  Actinic skin damage  - No obvious evidence of recurrence at site of previous malignancy  - discussed sunprotective behaviors, clothing, and the use of sunscreen        Cryotherapy procedure note: After verbal consent and discussion of risks and benefits including but no limited to dyspigmentation/scar, blister, and pain, 4 ISK L chin and neck was(were) treated with 1-2mm freeze border for 2 cycles with liquid nitrogen. Post cryotherapy instructions were provided.       Follow-up in 6 mo .       Staff Involved:  Staff Only    Michel Paniagua MD   of Dermatology  Department of Dermatology  [facility]      CC:   Chief Complaint   Patient presents with     Derm Problem     Fbscs        History of Present Illness:  Mr. Ignacio Sarmiento is a 72 year old male who presents as a return patient.    Spot on neck that is bothering him. Additionally some stuck on brown spots on L chin     Labs:      Physical exam:  Vitals: There were no vitals taken for this visit.  GEN: well developed, well-nourished, in no acute distress, in a pleasant mood.     SKIN: Calles phototype 2  - Full skin, which includes the head/face, both arms, chest, back,  abdomen,both legs, genitalia and/or groin buttocks, digits and/or nails, was examined.  - No obvious evidence of recurrence at site of previous malignancy  - Stuck on brown papules on trunk and extremities   - Flat brown macules and patches in a sun exposed areas on face and extremities  - No other lesions of concern on areas examined.     Past Medical History:   Past Medical History:   Diagnosis Date     Cataracts, bilateral      Dyspepsia 10/18/2018     Elevated blood pressure reading without diagnosis of hypertension 10/18/2018     History of skin cancer 10/18/2018     Hyperlipidemia LDL goal <160 11/05/2018     Midline low back pain without sciatica 10/19/2018     Midline thoracic back pain, unspecified chronicity 10/18/2018     Mobitz I 08/12/2019     Primary osteoarthritis of ankle, unspecified laterality 11/09/2018     Seborrheic dermatitis 10/18/2018     Syncope 07/01/2019     Systolic hypertension 07/01/2019     Past Surgical History:   Procedure Laterality Date     ARTHROSCOPY KNEE Right      ARTHROSCOPY KNEE WITH MENISCECTOMY Left     open     COLONOSCOPY       COLONOSCOPY Left 9/22/2022    Procedure: COLONOSCOPY, FLEXIBLE, WITH POLYPECTOMIES REMOVAL USING COLD SNARE;  Surgeon: Hector Sinha MD;  Location:  GI       Social History:   reports that he quit smoking about 45 years ago. His smoking use included cigarettes. He has never used smokeless tobacco. He reports current alcohol use. He reports that he does not currently use drugs after having used the following drugs: Marijuana.    Family History:  Family History   Problem Relation Age of Onset     Colon Cancer No family hx of        Medications:  Current Outpatient Medications   Medication Sig Dispense Refill     bisacodyl (DULCOLAX) 5 MG EC tablet Take 2 tablets at 3 pm the day before your procedure. If your procedure is before 11 am, take 2 additional tablets at 8 pm. If your procedure is after 11 am, take 2 additional tablets at 6 am. For  additional instructions refer to your colonoscopy prep instructions. (Patient not taking: Reported on 10/31/2022) 4 tablet 0     polyethylene glycol (GOLYTELY) 236 g suspension The night before the exam at 6 pm drink an 8-ounce glass every 15 minutes until the jug is half empty. If you arrive before 11 AM: Drink the other half of the Golytely jug at 11 PM night before procedure. If you arrive after 11 AM: Drink the other half of the Golytely jug at 6 AM day of procedure. For additional instructions refer to your colonoscopy prep instructions. (Patient not taking: Reported on 10/31/2022) 4000 mL 0     No Known Allergies                Again, thank you for allowing me to participate in the care of your patient.        Sincerely,        Michel Paniagua MD

## 2023-05-30 ENCOUNTER — PATIENT OUTREACH (OUTPATIENT)
Dept: CARE COORDINATION | Facility: CLINIC | Age: 73
End: 2023-05-30
Payer: COMMERCIAL

## 2023-06-13 ENCOUNTER — PATIENT OUTREACH (OUTPATIENT)
Dept: CARE COORDINATION | Facility: CLINIC | Age: 73
End: 2023-06-13
Payer: COMMERCIAL

## 2023-09-09 ENCOUNTER — HEALTH MAINTENANCE LETTER (OUTPATIENT)
Age: 73
End: 2023-09-09

## 2023-10-09 ENCOUNTER — OFFICE VISIT (OUTPATIENT)
Dept: DERMATOLOGY | Facility: CLINIC | Age: 73
End: 2023-10-09
Payer: COMMERCIAL

## 2023-10-09 DIAGNOSIS — D48.5 NEOPLASM OF UNCERTAIN BEHAVIOR OF SKIN: ICD-10-CM

## 2023-10-09 DIAGNOSIS — L57.8 ACTINIC SKIN DAMAGE: ICD-10-CM

## 2023-10-09 DIAGNOSIS — L81.4 LENTIGINES: ICD-10-CM

## 2023-10-09 DIAGNOSIS — L82.0 INFLAMED SEBORRHEIC KERATOSIS: ICD-10-CM

## 2023-10-09 DIAGNOSIS — Z85.828 HISTORY OF BASAL CELL CARCINOMA: ICD-10-CM

## 2023-10-09 DIAGNOSIS — D48.9 NEOPLASM OF UNCERTAIN BEHAVIOR: Primary | ICD-10-CM

## 2023-10-09 PROCEDURE — 88305 TISSUE EXAM BY PATHOLOGIST: CPT | Performed by: DERMATOLOGY

## 2023-10-09 PROCEDURE — 99213 OFFICE O/P EST LOW 20 MIN: CPT | Mod: 25 | Performed by: STUDENT IN AN ORGANIZED HEALTH CARE EDUCATION/TRAINING PROGRAM

## 2023-10-09 PROCEDURE — 17110 DESTRUCTION B9 LES UP TO 14: CPT | Performed by: STUDENT IN AN ORGANIZED HEALTH CARE EDUCATION/TRAINING PROGRAM

## 2023-10-09 PROCEDURE — 11102 TANGNTL BX SKIN SINGLE LES: CPT | Mod: XS | Performed by: STUDENT IN AN ORGANIZED HEALTH CARE EDUCATION/TRAINING PROGRAM

## 2023-10-09 ASSESSMENT — PAIN SCALES - GENERAL: PAINLEVEL: NO PAIN (0)

## 2023-10-09 NOTE — PATIENT INSTRUCTIONS
Wound Care After a Biopsy    What is a skin biopsy?  A skin biopsy allows the doctor to examine a very small piece of tissue under the microscope to determine the diagnosis and the best treatment for the skin condition. A local anesthetic (numbing medicine) is injected with a very small needle into the skin area to be tested. A small piece of skin is taken from the area. Sometimes a suture (stitch) is used.     What are the risks of a skin biopsy?  I will experience scar, bleeding, swelling, pain, crusting and redness. I may experience incomplete removal or recurrence. Risks of this procedure are excessive bleeding, bruising, infection, nerve damage, numbness, thick (hypertrophic or keloidal) scar and non-diagnostic biopsy.    How should I care for my wound for the first 24 hours?  Keep the wound dry and covered for 24 hours  If it bleeds, hold direct pressure on the area for 15 minutes. If bleeding does not stop, call us or go to the emergency room  Avoid strenuous exercise the first 1-2 days or as your doctor instructs you    How should I care for the wound after 24 hours?  After 24 hours, remove the bandage  You may bathe or shower as normal  If you had a scalp biopsy, you can shampoo as usual and can use shower water to clean the biopsy site daily  Clean the wound once a day with gentle soap and water  Do not scrub, be gentle  Apply white petroleum/Vaseline after cleaning the wound with a cotton swab or a clean finger, and keep the site covered with a Bandaid /bandage. Bandages are not necessary with a scalp biopsy  If you are unable to cover the site with a Bandaid /bandage, re-apply ointment 2-3 times a day to keep the site moist. Moisture will help with healing  Avoid strenuous activity for first 1-2 days  Avoid lakes, rivers, pools, and oceans until the stitches are removed or the site is healed    How do I clean my wound?  Wash hands thoroughly with soap or use hand  before all wound care  Clean  the wound with gentle soap and water  Apply white petroleum/Vaseline  to wound after it is clean  Replace the Bandaid /bandage to keep the wound covered for the first few days or as instructed by your doctor  If you had a scalp biopsy, warm shower water to the area on a daily basis should suffice    What should I use to clean my wound?   Cotton-tipped applicators (Qtips )  White petroleum jelly (Vaseline ). Use a clean new container and use Q-tips to apply.  Bandaids  as needed  Gentle soap     How should I care for my wound long term?  Do not get your wound dirty  Keep up with wound care for one week or until the area is healed.  A small scab will form and fall off by itself when the area is completely healed. The area will be red and will become pink in color as it heals. Sun protection is very important for how your scar will turn out. Sunscreen with an SPF 30 or greater is recommended once the area is healed.  You should have some soreness but it should be mild and slowly go away over several days. Talk to your doctor about using tylenol for pain,    When should I call my doctor?  If you have increased:   Pain or swelling  Pus or drainage (clear or slightly yellow drainage is ok)  Temperature over 100F  Spreading redness or warmth around wound    When will I hear about my results?  The biopsy results can take 2 weeks to come back.  Your results will automatically release to bright box before your provider has even reviewed them.  The clinic will call you with the results, send you a bright box message, or have you schedule a follow-up clinic or phone time to discuss the results.  Contact our clinics if you do not hear from us in 2 weeks.    Who should I call with questions?  [facility]: [phone]  [facility]: [phone]  For urgent needs outside of business hours call the [facility] at [phone] and ask for the dermatology resident on call

## 2023-10-09 NOTE — LETTER
10/9/2023         RE: Ignacio Sarmiento  [address]  [facility] [address]        Dear Colleague,    Thank you for referring your patient, Ignacio Sarmiento, to the [facility]. Please see a copy of my visit note below.    [facility] Dermatology Note    Encounter Date: Oct 9, 2023    Dermatology Problem List:  #Hx NMSC last fall of 2021  - BCC x3 nose  - BCC R cheek    Major PMHx  -   ______________________________________    Impression/Plan:  Ignacio was seen today for skin check.    Diagnoses and all orders for this visit:    Neoplasm of uncertain behavior  -     SHAVE 1 [[id number]]  -     Dermatological Path Order and Indications; Standing  -     Dermatological Path Order and Indications  - favor bcc on scalp  -Discussed the need for surgery if this would occur.  Discussed in detail with the risks and benefits of having the area surgically excised  - Risk factors associated with surgery include age  - Patient wishes to proceed with surgery should this return as a basal cell carcinoma    History of basal cell carcinoma  - No obvious evidence of recurrence at site of previous malignancy    Actinic skin damage  Lentigines  - Reviewed the compounding benefits of incremental changes to sun protective clothing behaviors including increased frequency of sunscreen and sun protective clothing like broad brimmed hats and longsleeved UPF containing clothing    Inflamed seborrheic keratosis  -     DESTRUCT BENIGN LESION, UP TO 14  - x1 L cheek       - SHAVE BIOPSY PROCEDURE NOTE: After written informed consent was obtained, a time out was taken to identify the patient and the correct site for biopsy. The lesion on the L temporal scalp was cleansed with a 70% isopropyl alcohol wipe, and then injected with 1cc of lidocaine 1% with epinephrine 1:100,000. Once anesthesia was ensured, the visible surface of the lesion was biopsied using a [place] blade in  standard technique. Hemostasis was obtained with pressure and aluminum chloride 20% solution. The specimen was placed in a labeled formalin container and sent to pathology for sectioning and analysis. The wound was dressed with white petrolatum and an adhesive bandage. The patient tolerated the procedure well. Post-procedure instructions and recommendations were provided both verbally and in writing.    Cryotherapy procedure note: After verbal consent and discussion of risks and benefits including but no limited to dyspigmentation/scar, blister, and pain, 1 ISK L cheek was(were) treated with 1-2mm freeze border for 2 cycles with liquid nitrogen. Post cryotherapy instructions were provided.         Follow-up in 6 mo .       Staff Involved:  Staff Only    Michel Paniagua MD   of Dermatology  Department of Dermatology  [facility]      CC:   Chief Complaint   Patient presents with     Skin Check       History of Present Illness:  Mr. Ignacio Sarmiento is a 73 year old male who presents as a return patient.    Patient was last seen April 10, 2023 for history of basal cell scar carcinoma.  No recurrences were noted.  Several inflamed seborrheic keratoses were frozen on the chin and neck    Labs:      Physical exam:  Vitals: There were no vitals taken for this visit.  GEN: well developed, well-nourished, in no acute distress, in a pleasant mood.     SKIN: Calles phototype 1  - Full skin, which includes the head/face, both arms, chest, back, abdomen,both legs, genitalia and/or groin buttocks, digits and/or nails, was examined.  - Flat brown macules and patches in a sun exposed areas on face and extremities  - No obvious evidence of recurrence at site of previous malignancy  - Stuck on brown papules on trunk and extremities   - pearly papule L temporal scalp  - No other lesions of concern on areas examined.     Past Medical History:   Past Medical History:    Diagnosis Date     Basal cell carcinoma      Cataracts, bilateral      Dyspepsia 10/18/2018     Elevated blood pressure reading without diagnosis of hypertension 10/18/2018     History of skin cancer 10/18/2018     Hyperlipidemia LDL goal <160 11/05/2018     Midline low back pain without sciatica 10/19/2018     Midline thoracic back pain, unspecified chronicity 10/18/2018     Mobitz I 08/12/2019     Primary osteoarthritis of ankle, unspecified laterality 11/09/2018     Seborrheic dermatitis 10/18/2018     Syncope 07/01/2019     Systolic hypertension 07/01/2019     Past Surgical History:   Procedure Laterality Date     ARTHROSCOPY KNEE Right      ARTHROSCOPY KNEE WITH MENISCECTOMY Left     open     COLONOSCOPY       COLONOSCOPY Left 9/22/2022    Procedure: COLONOSCOPY, FLEXIBLE, WITH POLYPECTOMIES REMOVAL USING COLD SNARE;  Surgeon: Hector Sinha MD;  Location:  GI       Social History:   reports that he quit smoking about 45 years ago. His smoking use included cigarettes. He has never used smokeless tobacco. He reports current alcohol use. He reports that he does not currently use drugs after having used the following drugs: Marijuana.    Family History:  Family History   Problem Relation Age of Onset     Colon Cancer No family hx of        Medications:  Current Outpatient Medications   Medication Sig Dispense Refill     bisacodyl (DULCOLAX) 5 MG EC tablet Take 2 tablets at 3 pm the day before your procedure. If your procedure is before 11 am, take 2 additional tablets at 8 pm. If your procedure is after 11 am, take 2 additional tablets at 6 am. For additional instructions refer to your colonoscopy prep instructions. (Patient not taking: Reported on 10/31/2022) 4 tablet 0     polyethylene glycol (GOLYTELY) 236 g suspension The night before the exam at 6 pm drink an 8-ounce glass every 15 minutes until the jug is half empty. If you arrive before 11 AM: Drink the other half of the Memoroply jug at 11 PM night  before procedure. If you arrive after 11 AM: Drink the other half of the [place]an[place] jug at 6 AM day of procedure. For additional instructions refer to your colonoscopy prep instructions. (Patient not taking: Reported on 10/31/2022) 4000 mL 0     No Known Allergies              Again, thank you for allowing me to participate in the care of your patient.        Sincerely,        Michel Paniagua MD

## 2023-10-09 NOTE — PROGRESS NOTES
[facility] Dermatology Note    Encounter Date: Oct 9, 2023    Dermatology Problem List:  #Hx NMSC last fall of 2021  - BCC x3 nose  - BCC R cheek    Major PMHx  -   ______________________________________    Impression/Plan:  Ignacio was seen today for skin check.    Diagnoses and all orders for this visit:    Neoplasm of uncertain behavior  -     SHAVE 1 [[id number]]  -     Dermatological Path Order and Indications; Standing  -     Dermatological Path Order and Indications  - favor bcc on scalp  -Discussed the need for surgery if this would occur.  Discussed in detail with the risks and benefits of having the area surgically excised  - Risk factors associated with surgery include age  - Patient wishes to proceed with surgery should this return as a basal cell carcinoma    History of basal cell carcinoma  - No obvious evidence of recurrence at site of previous malignancy    Actinic skin damage  Lentigines  - Reviewed the compounding benefits of incremental changes to sun protective clothing behaviors including increased frequency of sunscreen and sun protective clothing like broad brimmed hats and longsleeved UPF containing clothing    Inflamed seborrheic keratosis  -     DESTRUCT BENIGN LESION, UP TO 14  - x1 L cheek       - SHAVE BIOPSY PROCEDURE NOTE: After written informed consent was obtained, a time out was taken to identify the patient and the correct site for biopsy. The lesion on the L temporal scalp was cleansed with a 70% isopropyl alcohol wipe, and then injected with 1cc of lidocaine 1% with epinephrine 1:100,000. Once anesthesia was ensured, the visible surface of the lesion was biopsied using a Niland blade in standard technique. Hemostasis was obtained with pressure and aluminum chloride 20% solution. The specimen was placed in a labeled formalin container and sent to pathology for sectioning and analysis. The wound was dressed with white petrolatum and an adhesive bandage. The patient  tolerated the procedure well. Post-procedure instructions and recommendations were provided both verbally and in writing.    Cryotherapy procedure note: After verbal consent and discussion of risks and benefits including but no limited to dyspigmentation/scar, blister, and pain, 1 ISK L cheek was(were) treated with 1-2mm freeze border for 2 cycles with liquid nitrogen. Post cryotherapy instructions were provided.         Follow-up in 6 mo .       Staff Involved:  Staff Only    Michel Paniagua MD   of Dermatology  Department of Dermatology  [facility]      CC:   Chief Complaint   Patient presents with    Skin Check       History of Present Illness:  Mr. Ignacio Sarmiento is a 73 year old male who presents as a return patient.    Patient was last seen April 10, 2023 for history of basal cell scar carcinoma.  No recurrences were noted.  Several inflamed seborrheic keratoses were frozen on the chin and neck    Labs:      Physical exam:  Vitals: There were no vitals taken for this visit.  GEN: well developed, well-nourished, in no acute distress, in a pleasant mood.     SKIN: Calles phototype 1  - Full skin, which includes the head/face, both arms, chest, back, abdomen,both legs, genitalia and/or groin buttocks, digits and/or nails, was examined.  - Flat brown macules and patches in a sun exposed areas on face and extremities  - No obvious evidence of recurrence at site of previous malignancy  - Stuck on brown papules on trunk and extremities   - pearly papule L temporal scalp  - No other lesions of concern on areas examined.     Past Medical History:   Past Medical History:   Diagnosis Date    Basal cell carcinoma     Cataracts, bilateral     Dyspepsia 10/18/2018    Elevated blood pressure reading without diagnosis of hypertension 10/18/2018    History of skin cancer 10/18/2018    Hyperlipidemia LDL goal <160 11/05/2018    Midline low back pain without sciatica  10/19/2018    Midline thoracic back pain, unspecified chronicity 10/18/2018    Mobitz I 08/12/2019    Primary osteoarthritis of ankle, unspecified laterality 11/09/2018    Seborrheic dermatitis 10/18/2018    Syncope 07/01/2019    Systolic hypertension 07/01/2019     Past Surgical History:   Procedure Laterality Date    ARTHROSCOPY KNEE Right     ARTHROSCOPY KNEE WITH MENISCECTOMY Left     open    COLONOSCOPY      COLONOSCOPY Left 9/22/2022    Procedure: COLONOSCOPY, FLEXIBLE, WITH POLYPECTOMIES REMOVAL USING COLD SNARE;  Surgeon: Hector Sinha MD;  Location:  GI       Social History:   reports that he quit smoking about 45 years ago. His smoking use included cigarettes. He has never used smokeless tobacco. He reports current alcohol use. He reports that he does not currently use drugs after having used the following drugs: Marijuana.    Family History:  Family History   Problem Relation Age of Onset    Colon Cancer No family hx of        Medications:  Current Outpatient Medications   Medication Sig Dispense Refill    bisacodyl (DULCOLAX) 5 MG EC tablet Take 2 tablets at 3 pm the day before your procedure. If your procedure is before 11 am, take 2 additional tablets at 8 pm. If your procedure is after 11 am, take 2 additional tablets at 6 am. For additional instructions refer to your colonoscopy prep instructions. (Patient not taking: Reported on 10/31/2022) 4 tablet 0    polyethylene glycol (GOLYTELY) 236 g suspension The night before the exam at 6 pm drink an 8-ounce glass every 15 minutes until the jug is half empty. If you arrive before 11 AM: Drink the other half of the Golytely jug at 11 PM night before procedure. If you arrive after 11 AM: Drink the other half of the Golytely jug at 6 AM day of procedure. For additional instructions refer to your colonoscopy prep instructions. (Patient not taking: Reported on 10/31/2022) 4000 mL 0     No Known Allergies

## 2023-10-12 ENCOUNTER — TELEPHONE (OUTPATIENT)
Dept: DERMATOLOGY | Facility: CLINIC | Age: 73
End: 2023-10-12
Payer: COMMERCIAL

## 2023-10-12 LAB
PATH REPORT.COMMENTS IMP SPEC: ABNORMAL
PATH REPORT.COMMENTS IMP SPEC: ABNORMAL
PATH REPORT.COMMENTS IMP SPEC: YES
PATH REPORT.FINAL DX SPEC: ABNORMAL
PATH REPORT.GROSS SPEC: ABNORMAL
PATH REPORT.MICROSCOPIC SPEC OTHER STN: ABNORMAL
PATH REPORT.RELEVANT HX SPEC: ABNORMAL

## 2023-10-12 NOTE — TELEPHONE ENCOUNTER
Called patient:  Patient notified and educated on test results   Mohs procedure explained- all questions answered  appointment scheduled- mohs packet mailed.     Thank you,  Ellen MARSHALL RN  Dermatology   [phone]

## 2023-10-12 NOTE — TELEPHONE ENCOUNTER
----- Message from Michel Paniagua MD sent at 10/12/2023 12:05 PM CDT -----  Please ref pt to mohs for treatment    A(1). L temporal scalp:  - Basal cell carcinoma, nodular type - (see description)

## 2023-10-12 NOTE — LETTER
[address]  [address]  [phone]        10/12/2023       Ignacio Sarmiento  [address]  [facility] [address]      Dear Ignacio:    You are scheduled for Mohs Surgery on: 12/14/23 at 8:00 am.    Please check in at [facility], [address].     You don't need to arrive more than 5-10 minutes prior to your appointment time.     Be sure to eat a good breakfast and bathe and wash your hair prior to surgery.     If you are taking any anti-coagulants that are prescribed by your Doctor (such as Coumadin/Warfarin, Plavix, Aspirin, Ibuprofen), please continue taking them.     However, if you are taking anti-coagulants over the counter without a Doctor's order for a medical condition, please discontinue them 10 days prior to surgery.     Please wear loose comfortable clothing as it could possibly be 4-6 hours until your surgery is completed depending upon how many layers of tissue need to be removed.      Thank you,    SHAAN Davis MD

## 2023-10-12 NOTE — TELEPHONE ENCOUNTER
Patient Contact    Attempt # 1    Was call answered?  Yes    Pt wife answered, she is going to have pt call me back.      Ellen MARSHALL RN  Dermatology   [phone]

## 2023-11-06 ASSESSMENT — ENCOUNTER SYMPTOMS
HEMATURIA: 0
WEAKNESS: 0
JOINT SWELLING: 0
NERVOUS/ANXIOUS: 0
CHILLS: 0
EYE PAIN: 0
MYALGIAS: 0
HEADACHES: 0
PARESTHESIAS: 0
DYSURIA: 0
PALPITATIONS: 0
CONSTIPATION: 0
SORE THROAT: 0
DIARRHEA: 0
SHORTNESS OF BREATH: 0
FREQUENCY: 0
HEARTBURN: 0
HEMATOCHEZIA: 0
ARTHRALGIAS: 0
DIZZINESS: 0
NAUSEA: 0
ABDOMINAL PAIN: 0
COUGH: 0
FEVER: 0

## 2023-11-06 ASSESSMENT — ACTIVITIES OF DAILY LIVING (ADL): CURRENT_FUNCTION: NO ASSISTANCE NEEDED

## 2023-11-08 ENCOUNTER — OFFICE VISIT (OUTPATIENT)
Dept: FAMILY MEDICINE | Facility: CLINIC | Age: 73
End: 2023-11-08
Payer: COMMERCIAL

## 2023-11-08 VITALS
WEIGHT: 172.9 LBS | OXYGEN SATURATION: 98 % | HEIGHT: 70 IN | TEMPERATURE: 98.7 F | BODY MASS INDEX: 24.75 KG/M2 | HEART RATE: 68 BPM | DIASTOLIC BLOOD PRESSURE: 74 MMHG | RESPIRATION RATE: 13 BRPM | SYSTOLIC BLOOD PRESSURE: 156 MMHG

## 2023-11-08 DIAGNOSIS — R73.01 ELEVATED FASTING GLUCOSE: ICD-10-CM

## 2023-11-08 DIAGNOSIS — Z00.00 ENCOUNTER FOR MEDICARE ANNUAL WELLNESS EXAM: Primary | ICD-10-CM

## 2023-11-08 DIAGNOSIS — R03.0 ELEVATED BLOOD PRESSURE READING WITHOUT DIAGNOSIS OF HYPERTENSION: ICD-10-CM

## 2023-11-08 DIAGNOSIS — Z13.6 SCREENING FOR AAA (ABDOMINAL AORTIC ANEURYSM): ICD-10-CM

## 2023-11-08 DIAGNOSIS — C76.0 MALIGNANT NEOPLASM OF HEAD, FACE, AND NECK (H): ICD-10-CM

## 2023-11-08 DIAGNOSIS — Z12.5 SCREENING FOR PROSTATE CANCER: ICD-10-CM

## 2023-11-08 DIAGNOSIS — E78.2 MIXED HYPERLIPIDEMIA: ICD-10-CM

## 2023-11-08 DIAGNOSIS — I47.10 SVT (SUPRAVENTRICULAR TACHYCARDIA) (H): ICD-10-CM

## 2023-11-08 DIAGNOSIS — Z87.891 HISTORY OF SMOKING: ICD-10-CM

## 2023-11-08 PROCEDURE — G0438 PPPS, INITIAL VISIT: HCPCS | Performed by: PHYSICIAN ASSISTANT

## 2023-11-08 ASSESSMENT — ENCOUNTER SYMPTOMS
ABDOMINAL PAIN: 0
PALPITATIONS: 0
ARTHRALGIAS: 0
HEMATURIA: 0
SHORTNESS OF BREATH: 0
JOINT SWELLING: 0
FREQUENCY: 0
SORE THROAT: 0
HEADACHES: 0
EYE PAIN: 0
PARESTHESIAS: 0
HEMATOCHEZIA: 0
CHILLS: 0
WEAKNESS: 0
COUGH: 0
DIZZINESS: 0
MYALGIAS: 0
HEARTBURN: 0
FEVER: 0
NERVOUS/ANXIOUS: 0
NAUSEA: 0
DIARRHEA: 0
CONSTIPATION: 0
DYSURIA: 0

## 2023-11-08 ASSESSMENT — ACTIVITIES OF DAILY LIVING (ADL): CURRENT_FUNCTION: NO ASSISTANCE NEEDED

## 2023-11-08 ASSESSMENT — PAIN SCALES - GENERAL: PAINLEVEL: NO PAIN (0)

## 2023-11-08 NOTE — PROGRESS NOTES
"SUBJECTIVE:   Ignacio is a 73 year old who presents for Preventive Visit.      11/8/2023     9:45 AM   Additional Questions   Roomed by MR   Accompanied by MIK         11/8/2023     9:45 AM   Patient Reported Additional Medications   Patient reports taking the following new medications MIK       Are you in the first 12 months of your Medicare coverage?  No    Healthy Habits:     In general, how would you rate your overall health?  Good    Frequency of exercise:  4-5 days/week    Duration of exercise:  30-45 minutes    Do you usually eat at least 4 servings of fruit and vegetables a day, include whole grains    & fiber and avoid regularly eating high fat or \"junk\" foods?  Yes    Taking medications regularly:  Yes    Ability to successfully perform activities of daily living:  No assistance needed    Home Safety:  No safety concerns identified    Hearing Impairment:  No hearing concerns    In the past 6 months, have you been bothered by leaking of urine?  No    In general, how would you rate your overall mental or emotional health?  Excellent    Additional concerns today:  Yes      HTN:   Not taking any blood pressure medication  Blood pressure at home usually 134/80  No readings > 140/90  BP usually higher in clinic - he says he thinks due to white coat syndrome  No chest pain, shortness of breath, swelling in the extremities, palpitations, dizziness, headaches, blurred vision.     Regular exercise - runner    Hyperlipidemia  He is not fasting today  He is not interested in lipid lowering medications; prefers healthy diet and regular exercise.       Today's PHQ-2 Score:       11/8/2023     9:43 AM   PHQ-2 ( 1999 [employer])   Q1: Little interest or pleasure in doing things 0   Q2: Feeling down, depressed or hopeless 0   PHQ-2 Score 0   Q1: Little interest or pleasure in doing things Not at all   Q2: Feeling down, depressed or hopeless Not at all   PHQ-2 Score 0       Have you ever done Advance Care Planning? (For example, a " Health Directive, POLST, or a discussion with a medical provider or your loved ones about your wishes): No, advance care planning information given to patient to review.  Patient declined advance care planning discussion at this time.        Fall risk  Fallen 2 or more times in the past year?: No  Any fall with injury in the past year?: No    Cognitive Screening   1) Repeat 3 items (Leader, Season, Table)    2) Clock draw: NORMAL  3) 3 item recall: Recalls 3 objects  Results: 3 items recalled: COGNITIVE IMPAIRMENT LESS LIKELY    Mini-CogTM Copyright GALILEO Ferreira. Licensed by the author for use in [facility]; reprinted with permission (kiera@[facility]). All rights reserved.      Do you have sleep apnea, excessive snoring or daytime drowsiness? : no    Reviewed and updated as needed this visit by clinical staff   Tobacco  Allergies  Meds  Problems  Med Hx  Surg Hx  Fam Hx          Reviewed and updated as needed this visit by Provider   Tobacco  Allergies  Meds  Problems  Med Hx  Surg Hx  Fam Hx         Social History     Tobacco Use     Smoking status: Former     Packs/day: 0     Types: Cigarettes     Quit date: 1976     Years since quittin.8     Smokeless tobacco: Never     Tobacco comments:     Smoked for 8-10 years, has smoked >100 cigarettes in lifetime   Substance Use Topics     Alcohol use: Yes     Comment: 2 berrs daily             2023     3:15 PM   Alcohol Use   Prescreen: >3 drinks/day or >7 drinks/week? Yes   AUDIT SCORE  6         2023     3:15 PM   AUDIT - Alcohol Use Disorders Identification Test - Reproduced from the World Health Organization Audit 2001 (Second Edition)   1.  How often do you have a drink containing alcohol? 4 or more times a week   2.  How many drinks containing alcohol do you have on a typical day when you are drinking? 3 or 4   3.  How often do you have five or more drinks on one occasion? Less than monthly   4.  How often during the last year  have you found that you were not able to stop drinking once you had started? Never   5.  How often during the last year have you failed to do what was normally expected of you because of drinking? Never   6.  How often during the last year have you needed a first drink in the morning to get yourself going after a heavy drinking session? Never   7.  How often during the last year have you had a feeling of guilt or remorse after drinking? Never   8.  How often during the last year have you been unable to remember what happened the night before because of your drinking? Never   9.  Have you or someone else been injured because of your drinking? No   10. Has a relative, friend, doctor or other health care worker been concerned about your drinking or suggested you cut down? No   TOTAL SCORE 6     Do you have a current opioid prescription? No  Do you use any other controlled substances or medications that are not prescribed by a provider? Alcohol        Current providers sharing in care for this patient include:   Patient Care Team:  Nieves Gaston MD as PCP - General (Internal Medicine)  Nieves Gaston MD as Assigned PCP  Michel Paniagua MD as MD (Dermatology)  Michel Paniagua MD as Assigned Surgical Provider    The following health maintenance items are reviewed in Epic and correct as of today:  Health Maintenance   Topic Date Due     ZOSTER IMMUNIZATION (1 of 2) Never done     RSV VACCINE (Pregnancy & 60+) (1 - 1-dose 60+ series) Never done     PSA  12/17/2022     MEDICARE ANNUAL WELLNESS VISIT  06/29/2023     ANNUAL REVIEW OF HM ORDERS  11/08/2024     FALL RISK ASSESSMENT  11/08/2024     LIPID  07/25/2027     COLORECTAL CANCER SCREENING  09/22/2027     ADVANCE CARE PLANNING  11/08/2028     DTAP/TDAP/TD IMMUNIZATION (4 - Td or Tdap) 04/17/2029     HEPATITIS C SCREENING  Completed     PHQ-2 (once per calendar year)  Completed     INFLUENZA VACCINE  Completed     Pneumococcal Vaccine: 65+ Years  Completed      AORTIC ANEURYSM SCREENING (SYSTEM ASSIGNED)  Completed     COVID-19 Vaccine  Completed     IPV IMMUNIZATION  Aged Out     HPV IMMUNIZATION  Aged Out     MENINGITIS IMMUNIZATION  Aged Out     RSV MONOCLONAL ANTIBODY  Aged Out     Labs reviewed in EPIC  BP Readings from Last 3 Encounters:   23 (!) 156/74   22 135/82   22 122/68    Wt Readings from Last 3 Encounters:   23 78.4 kg (172 lb 14.4 oz)   22 75.4 kg (166 lb 3.2 oz)   22 76.9 kg (169 lb 8 oz)                  Patient Active Problem List   Diagnosis     Seborrheic dermatitis     History of skin cancer     Midline thoracic back pain, unspecified chronicity     Dyspepsia     Midline low back pain without sciatica     Hyperlipidemia LDL goal <160     Primary osteoarthritis of ankle, unspecified laterality     Syncope     Systolic hypertension     Mobitz I     Paroxysmal supraventricular tachycardia     Malignant neoplasm of head, face, and neck (H)     Past Surgical History:   Procedure Laterality Date     ARTHROSCOPY KNEE Right      ARTHROSCOPY KNEE WITH MENISCECTOMY Left     open     COLONOSCOPY       COLONOSCOPY Left 2022    Procedure: COLONOSCOPY, FLEXIBLE, WITH POLYPECTOMIES REMOVAL USING COLD SNARE;  Surgeon: Hector Sniha MD;  Location:  GI       Social History     Tobacco Use     Smoking status: Former     Packs/day: 0     Types: Cigarettes     Quit date: 1976     Years since quittin.8     Smokeless tobacco: Never     Tobacco comments:     Smoked for 8-10 years, has smoked >100 cigarettes in lifetime   Substance Use Topics     Alcohol use: Yes     Comment: 2 berrs daily     Family History   Problem Relation Age of Onset     Diabetes Sister         Type 2     Colon Cancer No family hx of          Current Outpatient Medications   Medication Sig Dispense Refill     Misc Natural Products (JOINT HEALTH PO) MOVE FREE ADVANCED       Multiple Vitamin (MULTI VITAMIN DAILY PO)        vitamin B-Complex Take 1  "tablet by mouth daily       No Known Allergies  Recent Labs   Lab Test 07/25/22  0912 03/09/21  2132 12/17/20  0912 10/18/18  1539   *  --  105* 101*   HDL 83  --  81 84   TRIG 82  --  75 99   ALT 21  --  25 21   CR 0.78 0.70 0.70 0.86   GFRESTIMATED >90 >90 >90 88   GFRESTBLACK  --  >90 >90 >90   POTASSIUM 4.1 4.2 3.9 3.8   TSH  --   --   --  1.43        Review of Systems   Constitutional:  Negative for chills and fever.   HENT:  Negative for congestion, ear pain, hearing loss and sore throat.    Eyes:  Negative for pain and visual disturbance.   Respiratory:  Negative for cough and shortness of breath.    Cardiovascular:  Negative for chest pain, palpitations and peripheral edema.   Gastrointestinal:  Negative for abdominal pain, constipation, diarrhea, heartburn, hematochezia and nausea.   Genitourinary:  Negative for dysuria, frequency, genital sores, hematuria, impotence, penile discharge and urgency.   Musculoskeletal:  Negative for arthralgias, joint swelling and myalgias.   Skin:  Negative for rash.   Neurological:  Negative for dizziness, weakness, headaches and paresthesias.   Psychiatric/Behavioral:  Negative for mood changes. The patient is not nervous/anxious.        OBJECTIVE:   BP (!) 156/74 (BP Location: Right arm, Patient Position: Sitting, Cuff Size: Adult Large)   Pulse 68   Temp 98.7  F (37.1  C) (Oral)   Resp 13   Ht 1.765 m (5' 9.5\")   Wt 78.4 kg (172 lb 14.4 oz)   SpO2 98%   BMI 25.17 kg/m   Estimated body mass index is 25.17 kg/m  as calculated from the following:    Height as of this encounter: 1.765 m (5' 9.5\").    Weight as of this encounter: 78.4 kg (172 lb 14.4 oz).  Physical Exam  GENERAL: healthy, alert and no distress  EYES: Eyes grossly normal to inspection, PERRL and conjunctivae and sclerae normal  HENT: ear canals and TM's normal, nose and mouth without ulcers or lesions  NECK: no adenopathy, no asymmetry, masses, or scars and thyroid normal to palpation  RESP: lungs " clear to auscultation - no rales, rhonchi or wheezes  CV: regular rate and rhythm, normal S1 S2, no S3 or S4, no murmur, click or rub, no peripheral edema and peripheral pulses strong  ABDOMEN: soft, nontender, no hepatosplenomegaly, no masses and bowel sounds normal  MS: no gross musculoskeletal defects noted, no edema  SKIN: no suspicious lesions or rashes  NEURO: Normal strength and tone, mentation intact and speech normal  PSYCH: mentation appears normal, affect normal/bright    Diagnostic Test Results:  He will return for fasting labs    ASSESSMENT / PLAN:   Encounter for Medicare annual wellness exam  Reviewed personal and family history. Reviewed age appropriate screenings. Reviewed healthy BP and BMI ranges. Counseled on lifestyle modifications for optimal mental and physical health.  Discussed age-appropriate health maintenance. Recommended any needed vaccinations. Continue to focus on well balanced diet and exercise.     Screening for prostate cancer  Discussed, he would like screening  - PROSTATE SPEC ANTIGEN SCREEN; Future    Elevated fasting glucose  - Basic metabolic panel  (Ca, Cl, CO2, Creat, Gluc, K, Na, BUN); Future  - Hemoglobin A1c; Future    Mixed hyperlipidemia  He will return for fasting labs  - Lipid panel reflex to direct LDL Fasting; Future    Malignant neoplasm of head, face, and neck (H)  He follows with dermatology regularly    Screening for AAA (abdominal aortic aneurysm)  He has smoked >100 cigarettes in lifetime. Discussed, he agrees to screening.  - Abdominal Aortic Aneurysm Screening/Tracking    History of smoking  - Abdominal Aortic Aneurysm Screening/Tracking  - US Abdominal Aorta Imaging; Future     Elevated blood pressure reading without diagnosis of hypertension  Not taking any blood pressure medication  Blood pressure at home usually 134/80  No readings > 140/90  BP usually higher in clinic - he says he thinks due to white coat syndrome  He will continue to monitor BP at home  and follow up if consistently >140/90    SVT  2019 he was evaluated by cardiology   He had a 1 week ZIO monitor with his primary physician that showed asymptomatic SVT as well as second-degree type I AV block when sleeping at night or napping in the afternoon.  He denies further symptoms.     COUNSELING:  Reviewed preventive health counseling, as reflected in patient instructions        He reports that he quit smoking about 47 years ago. His smoking use included cigarettes. He has never used smokeless tobacco.      Appropriate preventive services were discussed with this patient, including applicable screening as appropriate for fall prevention, nutrition, physical activity, Tobacco-use cessation, weight loss and cognition.  Checklist reviewing preventive services available has been given to the patient.    Reviewed patients plan of care and provided an AVS. The Basic Care Plan (routine screening as documented in Health Maintenance) for Ignacio meets the Care Plan requirement. This Care Plan has been established and reviewed with the Patient.          Lorraine Salgado PA-C  [facility]    Identified Health Risks:  I have reviewed Opioid Use Disorder and Substance Use Disorder risk factors and made any needed referrals.

## 2023-11-08 NOTE — PATIENT INSTRUCTIONS
How to Check Your Blood Pressure at Home    Make sure your blood pressure cuff is validated (produces accurate readings). Go to validateBP.org for a list of blood pressure cuffs.     Take your blood pressure right after you wake up and right before going to bed.     Make sure the readings are before you take your medications, smoke and after you empty your bladder.   Sit with good back support and feet flat on the floor. Have your blood pressure cuff resting at your heart level.     If your numbers are consistently above 140/90, please schedule a follow-up visit to discuss.      Patient Education   Personalized Prevention Plan  You are due for the preventive services outlined below.  Your care team is available to assist you in scheduling these services.  If you have already completed any of these items, please share that information with your care team to update in your medical record.  Health Maintenance Due   Topic Date Due    Zoster (Shingles) Vaccine (1 of 2) Never done    RSV VACCINE (Pregnancy & 60+) (1 - 1-dose 60+ series) Never done    AORTIC ANEURYSM SCREENING (SYSTEM ASSIGNED)  Never done    Prostate Test  12/17/2022    Annual Wellness Visit  06/29/2023    ANNUAL REVIEW OF HM ORDERS  06/29/2023

## 2023-11-22 ENCOUNTER — HOSPITAL ENCOUNTER (OUTPATIENT)
Dept: ULTRASOUND IMAGING | Facility: CLINIC | Age: 73
Discharge: HOME | End: 2023-11-22
Attending: PHYSICIAN ASSISTANT | Admitting: PHYSICIAN ASSISTANT
Payer: COMMERCIAL

## 2023-11-22 DIAGNOSIS — Z87.891 HISTORY OF SMOKING: ICD-10-CM

## 2023-11-22 PROCEDURE — 76775 US EXAM ABDO BACK WALL LIM: CPT

## 2023-12-12 ENCOUNTER — LAB (OUTPATIENT)
Dept: LAB | Facility: CLINIC | Age: 73
End: 2023-12-12
Payer: COMMERCIAL

## 2023-12-12 DIAGNOSIS — R73.01 ELEVATED FASTING GLUCOSE: ICD-10-CM

## 2023-12-12 DIAGNOSIS — Z12.5 SCREENING FOR PROSTATE CANCER: ICD-10-CM

## 2023-12-12 DIAGNOSIS — E78.2 MIXED HYPERLIPIDEMIA: ICD-10-CM

## 2023-12-12 LAB
ANION GAP SERPL CALCULATED.3IONS-SCNC: 10 MMOL/L (ref 7–15)
BUN SERPL-MCNC: 15.6 MG/DL (ref 8–23)
CALCIUM SERPL-MCNC: 9.5 MG/DL (ref 8.8–10.2)
CHLORIDE SERPL-SCNC: 101 MMOL/L (ref 98–107)
CHOLEST SERPL-MCNC: 199 MG/DL
CREAT SERPL-MCNC: 0.75 MG/DL (ref 0.67–1.17)
DEPRECATED HCO3 PLAS-SCNC: 29 MMOL/L (ref 22–29)
EGFRCR SERPLBLD CKD-EPI 2021: >90 ML/MIN/1.73M2
FASTING STATUS PATIENT QL REPORTED: YES
GLUCOSE SERPL-MCNC: 115 MG/DL (ref 70–99)
HBA1C MFR BLD: 5.6 % (ref 0–5.6)
HDLC SERPL-MCNC: 79 MG/DL
LDLC SERPL CALC-MCNC: 106 MG/DL
NONHDLC SERPL-MCNC: 120 MG/DL
POTASSIUM SERPL-SCNC: 4 MMOL/L (ref 3.4–5.3)
PSA SERPL DL<=0.01 NG/ML-MCNC: 1.77 NG/ML (ref 0–6.5)
SODIUM SERPL-SCNC: 140 MMOL/L (ref 135–145)
TRIGL SERPL-MCNC: 68 MG/DL

## 2023-12-12 PROCEDURE — 80061 LIPID PANEL: CPT

## 2023-12-12 PROCEDURE — 36415 COLL VENOUS BLD VENIPUNCTURE: CPT

## 2023-12-12 PROCEDURE — 80048 BASIC METABOLIC PNL TOTAL CA: CPT

## 2023-12-12 PROCEDURE — 83036 HEMOGLOBIN GLYCOSYLATED A1C: CPT

## 2023-12-12 PROCEDURE — G0103 PSA SCREENING: HCPCS

## 2023-12-13 NOTE — PROGRESS NOTES
Surgical Office Location:  [facility]  [address] [place] [id number]

## 2023-12-14 ENCOUNTER — OFFICE VISIT (OUTPATIENT)
Dept: DERMATOLOGY | Facility: CLINIC | Age: 73
End: 2023-12-14
Payer: COMMERCIAL

## 2023-12-14 DIAGNOSIS — D18.01 ANGIOMA OF SKIN: ICD-10-CM

## 2023-12-14 DIAGNOSIS — C44.41 BASAL CELL CARCINOMA (BCC) OF SCALP: Primary | ICD-10-CM

## 2023-12-14 DIAGNOSIS — Z85.828 HISTORY OF SKIN CANCER: ICD-10-CM

## 2023-12-14 DIAGNOSIS — L81.4 LENTIGINES: ICD-10-CM

## 2023-12-14 DIAGNOSIS — L82.1 SEBORRHEIC KERATOSES: ICD-10-CM

## 2023-12-14 DIAGNOSIS — D23.9 DERMAL NEVUS: ICD-10-CM

## 2023-12-14 PROCEDURE — 17311 MOHS 1 STAGE H/N/HF/G: CPT | Performed by: DERMATOLOGY

## 2023-12-14 PROCEDURE — 99213 OFFICE O/P EST LOW 20 MIN: CPT | Mod: 25 | Performed by: DERMATOLOGY

## 2023-12-14 NOTE — LETTER
12/14/2023         RE: Ignacio Sarmiento  [id number]  [place] [address]        Dear Colleague,    Thank you for referring your patient, Ignacio Sarmiento, to the [facility]. Please see a copy of my visit note below.    Surgical Office Location:  [facility]  [address] [place] [id number]      Ignacio Sarmiento , a 73 year old year old male patient, I was asked to see by Dr. Paniagua for basal cell carcinoma on left scalp.  .  Patient has no other skin complaints today.  Remainder of the HPI, Meds, PMH, Allergies, FH, and SH was reviewed in chart.      Past Medical History:   Diagnosis Date     Basal cell carcinoma      Cataracts, bilateral      Dyspepsia 10/18/2018     Elevated blood pressure reading without diagnosis of hypertension 10/18/2018     History of skin cancer 10/18/2018     Hyperlipidemia LDL goal <160 11/05/2018     Midline low back pain without sciatica 10/19/2018     Midline thoracic back pain, unspecified chronicity 10/18/2018     Mobitz I 08/12/2019     Primary osteoarthritis of ankle, unspecified laterality 11/09/2018     Seborrheic dermatitis 10/18/2018     Syncope 07/01/2019     Systolic hypertension 07/01/2019       Past Surgical History:   Procedure Laterality Date     ARTHROSCOPY KNEE Right      ARTHROSCOPY KNEE WITH MENISCECTOMY Left     open     COLONOSCOPY       COLONOSCOPY Left 9/22/2022    Procedure: COLONOSCOPY, FLEXIBLE, WITH POLYPECTOMIES REMOVAL USING COLD SNARE;  Surgeon: Hector Sinha MD;  Location:  GI        Family History   Problem Relation Age of Onset     Diabetes Sister         Type 2     Colon Cancer No family hx of        Social History     Socioeconomic History     Marital status:      Spouse name: Not on file     Number of children: Not on file     Years of education: Not on file     Highest education level: Not on file   Occupational History     Not on file   Tobacco Use      Smoking status: Former     Packs/day: 0     Types: Cigarettes     Quit date: 1976     Years since quittin.9     Smokeless tobacco: Never     Tobacco comments:     Smoked for 8-10 years, has smoked >100 cigarettes in lifetime   Vaping Use     Vaping Use: Never used   Substance and Sexual Activity     Alcohol use: Yes     Comment: 2 berrs daily     Drug use: Not Currently     Types: Marijuana     Sexual activity: Yes     Partners: Female   Other Topics Concern     Parent/sibling w/ CABG, MI or angioplasty before 65F 55M? No   Social History Narrative     Not on file     Social Determinants of Health     Financial Resource Strain: Low Risk  (2023)    Financial Resource Strain      Within the past 12 months, have you or your family members you live with been unable to get utilities (heat, electricity) when it was really needed?: No   Food Insecurity: Low Risk  (2023)    Food Insecurity      Within the past 12 months, did you worry that your food would run out before you got money to buy more?: No      Within the past 12 months, did the food you bought just not last and you didn t have money to get more?: No   Transportation Needs: Low Risk  (2023)    Transportation Needs      Within the past 12 months, has lack of transportation kept you from medical appointments, getting your medicines, non-medical meetings or appointments, work, or from getting things that you need?: No   Physical Activity: Not on file   Stress: Not on file   Social Connections: Not on file   Interpersonal Safety: Low Risk  (2023)    Interpersonal Safety      Do you feel physically and emotionally safe where you currently live?: Yes      Within the past 12 months, have you been hit, slapped, kicked or otherwise physically hurt by someone?: No      Within the past 12 months, have you been humiliated or emotionally abused in other ways by your partner or ex-partner?: No   Housing Stability: Low Risk  (2023)    Housing  Stability      Do you have housing? : Yes      Are you worried about losing your housing?: No       Outpatient Encounter Medications as of 12/14/2023   Medication Sig Dispense Refill     Misc Natural Products (JOINT HEALTH PO) MOVE FREE ADVANCED       Multiple Vitamin (MULTI VITAMIN DAILY PO)        vitamin B-Complex Take 1 tablet by mouth daily       No facility-administered encounter medications on file as of 12/14/2023.             Review Of Systems  Skin: As above  Eyes: negative  Ears/Nose/Throat: negative  Respiratory: No shortness of breath, dyspnea on exertion, cough, or hemoptysis  Cardiovascular: negative  Gastrointestinal: negative  Genitourinary: negative  Musculoskeletal: negative  Neurologic: negative  Psychiatric: negative  Hematologic/Lymphatic/Immunologic: negative  Endocrine: negative      O:   NAD, WDWN, Alert & Oriented, Mood & Affect wnl, Vitals stable   General appearance marisela ii   Vitals stable   Alert, oriented and in no acute distress   L temporal scalp 5mm scaly papule    Stuck on papules and brown macules on trunk and ext    Red papules on trunk   Flesh colored papules on trunk          Eyes: Conjunctivae/lids:Normal     ENT: Lips, buccal mucosa, tongue: normal    MSK:Normal    Cardiovascular: peripheral edema none    Pulm: Breathing Normal    Neuro/Psych: Orientation:Normal; Mood/Affect:Normal      A/P:  1. Seborrheic keratosis, lentigo, angioma, dermal nevus, hx of non-melanoma skin cancer   2. L scalp basal cell carcinoma   It was a pleasure speaking to Ignacio Sarmiento today.  Previous clinic  notes and pertinent laboratory tests were reviewed prior to Ignacio Sarmiento's visit.  Signs and Symptoms of skin cancer discussed with patient.  Patient encouraged to perform monthly skin exams.  UV precautions reviewed with patient.  Risks of non-melanoma skin cancer discussed with patient   Return to clinic 6 months  PROCEDURE NOTE  L temporal scalp basal cell carcinoma   MOHS:    Location    The rationale for Mohs surgery was discussed with the patient and consent was obtained.  The risks and benefits as well as alternatives to therapy were discussed, in detail.  Specifically, the risks of infection, scarring, bleeding, prolonged wound healing, incomplete removal, allergy to anesthesia, nerve injury and recurrence were addressed.  Indication for Mohs was Location. Prior to the procedure, the treatment site was clearly identified and, if available, confirmed with previous photos and confirmed by the patient   All components of the Universal Protocol/PAUSE rule were completed.  The Mohs surgeon operated in two distinct and integrated capacities as the surgeon and pathologist.      The area was prepped with Betasept.  A rim of normal appearing skin was marked circumferentially around the lesion.  The area was infiltrated with local anesthesia.  The tumor was first debulked to remove all clinically apparent tumor.  An incision following the standard Mohs approach was done and the specimen was oriented,mapped and placed in 1 block(s).  Each specimen was then chromacoded and processed in the Mohs laboratory using standard Mohs technique and submitted for frozen section histology.  Frozen section analysis showed no residual tumor but CLEAR MARGINS.      The tumor was excised using standard Mohs technique in 1 stages(s).  CLEAR MARGINS OBTAINED and Final defect size was 1.1 cm.     We discussed the options for wound management in full with the patient including risks/benefits/ possible outcomes.      REPAIR SECOND INTENT: We discussed the options for wound management in full with the patient including risks/benefits/possible outcomes. Decision made to allow the wound to heal by second intention. Cautery was used for for hemostasis. EBL minimal; complications none; wound care routine.  The patient was discharged in good condition and will return in one month or prn for wound  evaluation.        Again, thank you for allowing me to participate in the care of your patient.        Sincerely,        Ja Davis MD

## 2023-12-14 NOTE — PATIENT INSTRUCTIONS
Open Wound Care     for ______________        No strenuous activity for 48 hours    Take Tylenol as needed for discomfort.                                                .         Do not drink alcoholic beverages for 48 hours.    Keep the pressure bandage in place for 24 hours. If the bandage becomes blood tinged or loose, reinforce it with gauze and tape.        (Refer to the reverse side of this page for management of bleeding).    Remove bandage in 24 hours and begin wound care as follows:     Clean area with tap water using a Q tip or gauze pad, (shower / bathe normally)  Dry wound with Q tip or gauze pad  Apply Aquaphor, Vaseline, Polysporin or Bacitracin Ointment with a Q tip  Do NOT use Neosporin Ointment *  Cover the wound with a band-aid or nonstick gauze pad and paper tape.  Repeat wound care once a day until wound is completely healed.    It is an old wives tale that a wound heals better when it is exposed to air and allowed to dry out. The wound will heal faster with a better cosmetic result if it is kept moist with ointment and covered with a bandage.  Do not let the wound dry out.      Supplies Needed:                Qtips or gauze pads                Polysporin or Bacitracin Ointment                Bandaids or nonstick gauze pads and paper tape    Wound care kits and brown paper tape are available for purchase at   the pharmacy.    BLEEDING:    Use tightly rolled up gauze or cloth to apply direct pressure over the bandage for 20   minutes.  Reapply pressure for an additional 20 minutes if necessary  Call the office or go to the nearest emergency room if pressure fails to stop the bleeding.  Use additional gauze and tape to maintain pressure once the bleeding has stopped.  Begin wound care 24 hours after surgery as directed.                  WOUND HEALING    One week after surgery a pink / red halo will form around the outside of the wound.   This is new skin.  The center of the wound will appear  yellowish white and produce some drainage.  The pink halo will slowly migrate in toward the center of the wound until the wound is covered with new shiny pink skin.  There will be no more drainage when the wound is completely healed.  It will take six months to one year for the redness to fade.  The scar may be itchy, tight and sensitive to extreme temperatures for a year after the surgery.  Massaging the area several times a day for several minutes after the wound is completely healed will help the scar soften and normalize faster. Begin massage only after healing is complete.      In case of emergency call: Dr Davis: [phone]    [facility]: [phone]    [facility]:[phone]

## 2023-12-14 NOTE — PROGRESS NOTES
Ignacio Sarmiento , a 73 year old year old male patient, I was asked to see by Dr. Paniagua for basal cell carcinoma on left scalp.  .  Patient has no other skin complaints today.  Remainder of the HPI, Meds, PMH, Allergies, FH, and SH was reviewed in chart.      Past Medical History:   Diagnosis Date    Basal cell carcinoma     Cataracts, bilateral     Dyspepsia 10/18/2018    Elevated blood pressure reading without diagnosis of hypertension 10/18/2018    History of skin cancer 10/18/2018    Hyperlipidemia LDL goal <160 2018    Midline low back pain without sciatica 10/19/2018    Midline thoracic back pain, unspecified chronicity 10/18/2018    Mobitz I 2019    Primary osteoarthritis of ankle, unspecified laterality 2018    Seborrheic dermatitis 10/18/2018    Syncope 2019    Systolic hypertension 2019       Past Surgical History:   Procedure Laterality Date    ARTHROSCOPY KNEE Right     ARTHROSCOPY KNEE WITH MENISCECTOMY Left     open    COLONOSCOPY      COLONOSCOPY Left 2022    Procedure: COLONOSCOPY, FLEXIBLE, WITH POLYPECTOMIES REMOVAL USING COLD SNARE;  Surgeon: Hector Sinha MD;  Location:  GI        Family History   Problem Relation Age of Onset    Diabetes Sister         Type 2    Colon Cancer No family hx of        Social History     Socioeconomic History    Marital status:      Spouse name: Not on file    Number of children: Not on file    Years of education: Not on file    Highest education level: Not on file   Occupational History    Not on file   Tobacco Use    Smoking status: Former     Packs/day: 0     Types: Cigarettes     Quit date: 1976     Years since quittin.9    Smokeless tobacco: Never    Tobacco comments:     Smoked for 8-10 years, has smoked >100 cigarettes in lifetime   Vaping Use    Vaping Use: Never used   Substance and Sexual Activity    Alcohol use: Yes     Comment: 2 berrs daily    Drug use: Not Currently     Types: Marijuana     Sexual activity: Yes     Partners: Female   Other Topics Concern    Parent/sibling w/ CABG, MI or angioplasty before 65F 55M? No   Social History Narrative    Not on file     Social Determinants of Health     Financial Resource Strain: Low Risk  (11/6/2023)    Financial Resource Strain     Within the past 12 months, have you or your family members you live with been unable to get utilities (heat, electricity) when it was really needed?: No   Food Insecurity: Low Risk  (11/6/2023)    Food Insecurity     Within the past 12 months, did you worry that your food would run out before you got money to buy more?: No     Within the past 12 months, did the food you bought just not last and you didn t have money to get more?: No   Transportation Needs: Low Risk  (11/6/2023)    Transportation Needs     Within the past 12 months, has lack of transportation kept you from medical appointments, getting your medicines, non-medical meetings or appointments, work, or from getting things that you need?: No   Physical Activity: Not on file   Stress: Not on file   Social Connections: Not on file   Interpersonal Safety: Low Risk  (11/8/2023)    Interpersonal Safety     Do you feel physically and emotionally safe where you currently live?: Yes     Within the past 12 months, have you been hit, slapped, kicked or otherwise physically hurt by someone?: No     Within the past 12 months, have you been humiliated or emotionally abused in other ways by your partner or ex-partner?: No   Housing Stability: Low Risk  (11/6/2023)    Housing Stability     Do you have housing? : Yes     Are you worried about losing your housing?: No       Outpatient Encounter Medications as of 12/14/2023   Medication Sig Dispense Refill    Misc Natural Products (JOINT HEALTH PO) MOVE FREE ADVANCED      Multiple Vitamin (MULTI VITAMIN DAILY PO)       vitamin B-Complex Take 1 tablet by mouth daily       No facility-administered encounter medications on file as of  12/14/2023.             Review Of Systems  Skin: As above  Eyes: negative  Ears/Nose/Throat: negative  Respiratory: No shortness of breath, dyspnea on exertion, cough, or hemoptysis  Cardiovascular: negative  Gastrointestinal: negative  Genitourinary: negative  Musculoskeletal: negative  Neurologic: negative  Psychiatric: negative  Hematologic/Lymphatic/Immunologic: negative  Endocrine: negative      O:   NAD, WDWN, Alert & Oriented, Mood & Affect wnl, Vitals stable   General appearance marisela ii   Vitals stable   Alert, oriented and in no acute distress   L temporal scalp 5mm scaly papule    Stuck on papules and brown macules on trunk and ext    Red papules on trunk   Flesh colored papules on trunk          Eyes: Conjunctivae/lids:Normal     ENT: Lips, buccal mucosa, tongue: normal    MSK:Normal    Cardiovascular: peripheral edema none    Pulm: Breathing Normal    Neuro/Psych: Orientation:Normal; Mood/Affect:Normal      A/P:  1. Seborrheic keratosis, lentigo, angioma, dermal nevus, hx of non-melanoma skin cancer   2. L scalp basal cell carcinoma   It was a pleasure speaking to Ignacio Sarmiento today.  Previous clinic  notes and pertinent laboratory tests were reviewed prior to Ignacio Sarmiento's visit.  Signs and Symptoms of skin cancer discussed with patient.  Patient encouraged to perform monthly skin exams.  UV precautions reviewed with patient.  Risks of non-melanoma skin cancer discussed with patient   Return to clinic 6 months  PROCEDURE NOTE  L temporal scalp basal cell carcinoma   MOHS:   Location    The rationale for Mohs surgery was discussed with the patient and consent was obtained.  The risks and benefits as well as alternatives to therapy were discussed, in detail.  Specifically, the risks of infection, scarring, bleeding, prolonged wound healing, incomplete removal, allergy to anesthesia, nerve injury and recurrence were addressed.  Indication for Mohs was Location. Prior to the procedure,  the treatment site was clearly identified and, if available, confirmed with previous photos and confirmed by the patient   All components of the Universal Protocol/PAUSE rule were completed.  The Mohs surgeon operated in two distinct and integrated capacities as the surgeon and pathologist.      The area was prepped with Betasept.  A rim of normal appearing skin was marked circumferentially around the lesion.  The area was infiltrated with local anesthesia.  The tumor was first debulked to remove all clinically apparent tumor.  An incision following the standard Mohs approach was done and the specimen was oriented,mapped and placed in 1 block(s).  Each specimen was then chromacoded and processed in the Mohs laboratory using standard Mohs technique and submitted for frozen section histology.  Frozen section analysis showed no residual tumor but CLEAR MARGINS.      The tumor was excised using standard Mohs technique in 1 stages(s).  CLEAR MARGINS OBTAINED and Final defect size was 1.1 cm.     We discussed the options for wound management in full with the patient including risks/benefits/ possible outcomes.      REPAIR SECOND INTENT: We discussed the options for wound management in full with the patient including risks/benefits/possible outcomes. Decision made to allow the wound to heal by second intention. Cautery was used for for hemostasis. EBL minimal; complications none; wound care routine.  The patient was discharged in good condition and will return in one month or prn for wound evaluation.

## 2024-04-10 ENCOUNTER — OFFICE VISIT (OUTPATIENT)
Dept: DERMATOLOGY | Facility: CLINIC | Age: 74
End: 2024-04-10
Payer: COMMERCIAL

## 2024-04-10 DIAGNOSIS — D22.9 MULTIPLE BENIGN NEVI: ICD-10-CM

## 2024-04-10 DIAGNOSIS — L81.4 LENTIGINES: ICD-10-CM

## 2024-04-10 DIAGNOSIS — L82.1 SEBORRHEIC KERATOSES: ICD-10-CM

## 2024-04-10 DIAGNOSIS — Z85.828 HISTORY OF BASAL CELL CARCINOMA: Primary | ICD-10-CM

## 2024-04-10 PROCEDURE — 99213 OFFICE O/P EST LOW 20 MIN: CPT | Performed by: STUDENT IN AN ORGANIZED HEALTH CARE EDUCATION/TRAINING PROGRAM

## 2024-04-10 ASSESSMENT — PAIN SCALES - GENERAL: PAINLEVEL: NO PAIN (0)

## 2024-04-10 NOTE — PROGRESS NOTES
[facility] Dermatology Note    Encounter Date: Apr 10, 2024    Dermatology Problem List:  #Hx NMSC last fall of 2021  - BCC x3 nose  - BCC R cheek  - BCC scalp 10/2023    Major PMHx  -   ______________________________________    Impression/Plan:  Ignacio was seen today for skin check.    Diagnoses and all orders for this visit:    History of basal cell carcinoma  - No obvious evidence of recurrence at site of previous malignancy    Multiple benign nevi  Lentigines  - Reviewed the compounding benefits of incremental changes to sun protective clothing behaviors including increased frequency of sunscreen and sun protective clothing like broad brimmed hats and longsleeved UPF containing clothing    Seborrheic keratoses  - benign            Follow-up in 6 mo.       Staff Involved:  Staff Only    Michel Paniagua MD   of Dermatology  Department of Dermatology  [facility]      CC:   Chief Complaint   Patient presents with    Skin Check       History of Present Illness:  Mr. Ignacio Sarmiento is a 73 year old male who presents as a return patient.    Last seen October 2023 at that time a spot on the scalp was biopsied and returned as a basal skin cancer and was later treated with Mohs micrographic surgery on December 14.  Patient presents today for examination of spots on trunk and extremities        Labs:      Physical exam:  Vitals: There were no vitals taken for this visit.  GEN: well developed, well-nourished, in no acute distress, in a pleasant mood.     SKIN: Calles phototype 1  - Full skin, which includes the head/face, both arms, chest, back, abdomen,both legs, genitalia and/or groin buttocks, digits and/or nails, was examined.  - Flat brown macules and patches in a sun exposed areas on face and extremities  - Stuck on brown papules on trunk and extremities   - No other lesions of concern on areas examined.     Past Medical History:   Past  Medical History:   Diagnosis Date    Basal cell carcinoma     Cataracts, bilateral     Dyspepsia 10/18/2018    Elevated blood pressure reading without diagnosis of hypertension 10/18/2018    History of skin cancer 10/18/2018    Hyperlipidemia LDL goal <160 11/05/2018    Midline low back pain without sciatica 10/19/2018    Midline thoracic back pain, unspecified chronicity 10/18/2018    Mobitz I 08/12/2019    Primary osteoarthritis of ankle, unspecified laterality 11/09/2018    Seborrheic dermatitis 10/18/2018    Syncope 07/01/2019    Systolic hypertension 07/01/2019     Past Surgical History:   Procedure Laterality Date    ARTHROSCOPY KNEE Right     ARTHROSCOPY KNEE WITH MENISCECTOMY Left     open    COLONOSCOPY      COLONOSCOPY Left 9/22/2022    Procedure: COLONOSCOPY, FLEXIBLE, WITH POLYPECTOMIES REMOVAL USING COLD SNARE;  Surgeon: Hector Sinha MD;  Location:  GI       Social History:   reports that he quit smoking about 48 years ago. His smoking use included cigarettes. He has never used smokeless tobacco. He reports current alcohol use. He reports that he does not currently use drugs after having used the following drugs: Marijuana.    Family History:  Family History   Problem Relation Age of Onset    Diabetes Sister         Type 2    Colon Cancer No family hx of        Medications:  Current Outpatient Medications   Medication Sig Dispense Refill    Misc Natural Products (JOINT HEALTH PO) MOVE FREE ADVANCED      Multiple Vitamin (MULTI VITAMIN DAILY PO)       vitamin B-Complex Take 1 tablet by mouth daily       No Known Allergies

## 2024-04-10 NOTE — LETTER
4/10/2024         RE: Ignacio Sarmiento  [id number]  [place] [facility] [id number]        Dear Colleague,    Thank you for referring your patient, Ignacio Sarmiento, to the [facility]. Please see a copy of my visit note below.    [facility] Dermatology Note    Encounter Date: Apr 10, 2024    Dermatology Problem List:  #Hx NMSC last fall of 2021  - BCC x3 nose  - BCC R cheek  - BCC scalp 10/2023    Major PMHx  -   ______________________________________    Impression/Plan:  Ignacio was seen today for skin check.    Diagnoses and all orders for this visit:    History of basal cell carcinoma  - No obvious evidence of recurrence at site of previous malignancy    Multiple benign nevi  Lentigines  - Reviewed the compounding benefits of incremental changes to sun protective clothing behaviors including increased frequency of sunscreen and sun protective clothing like broad brimmed hats and longsleeved UPF containing clothing    Seborrheic keratoses  - benign            Follow-up in 6 mo.       Staff Involved:  Staff Only    Michel Paniagua MD   of Dermatology  Department of Dermatology  [facility]      CC:   Chief Complaint   Patient presents with     Skin Check       History of Present Illness:  Mr. Ignacio Sarmiento is a 73 year old male who presents as a return patient.    Last seen October 2023 at that time a spot on the scalp was biopsied and returned as a basal skin cancer and was later treated with Mohs micrographic surgery on December 14.  Patient presents today for examination of spots on trunk and extremities        Labs:      Physical exam:  Vitals: There were no vitals taken for this visit.  GEN: well developed, well-nourished, in no acute distress, in a pleasant mood.     SKIN: Calles phototype 1  - Full skin, which includes the head/face, both arms, chest, back, abdomen,both legs,  genitalia and/or groin buttocks, digits and/or nails, was examined.  - Flat brown macules and patches in a sun exposed areas on face and extremities  - Stuck on brown papules on trunk and extremities   - No other lesions of concern on areas examined.     Past Medical History:   Past Medical History:   Diagnosis Date     Basal cell carcinoma      Cataracts, bilateral      Dyspepsia 10/18/2018     Elevated blood pressure reading without diagnosis of hypertension 10/18/2018     History of skin cancer 10/18/2018     Hyperlipidemia LDL goal <160 11/05/2018     Midline low back pain without sciatica 10/19/2018     Midline thoracic back pain, unspecified chronicity 10/18/2018     Mobitz I 08/12/2019     Primary osteoarthritis of ankle, unspecified laterality 11/09/2018     Seborrheic dermatitis 10/18/2018     Syncope 07/01/2019     Systolic hypertension 07/01/2019     Past Surgical History:   Procedure Laterality Date     ARTHROSCOPY KNEE Right      ARTHROSCOPY KNEE WITH MENISCECTOMY Left     open     COLONOSCOPY       COLONOSCOPY Left 9/22/2022    Procedure: COLONOSCOPY, FLEXIBLE, WITH POLYPECTOMIES REMOVAL USING COLD SNARE;  Surgeon: Hector Sinha MD;  Location:  GI       Social History:   reports that he quit smoking about 48 years ago. His smoking use included cigarettes. He has never used smokeless tobacco. He reports current alcohol use. He reports that he does not currently use drugs after having used the following drugs: Marijuana.    Family History:  Family History   Problem Relation Age of Onset     Diabetes Sister         Type 2     Colon Cancer No family hx of        Medications:  Current Outpatient Medications   Medication Sig Dispense Refill     Misc Natural Products (JOINT HEALTH PO) MOVE FREE ADVANCED       Multiple Vitamin (MULTI VITAMIN DAILY PO)        vitamin B-Complex Take 1 tablet by mouth daily       No Known Allergies              Again, thank you for allowing me to participate in the care of  your patient.        Sincerely,        Michel Paniagua MD

## 2024-04-18 ENCOUNTER — TELEPHONE (OUTPATIENT)
Dept: FAMILY MEDICINE | Facility: CLINIC | Age: 74
End: 2024-04-18

## 2024-04-18 NOTE — TELEPHONE ENCOUNTER
Patient and wife walked into clinic. Wife currently being treated for h. Pylori and they would like patient tested as they were told it can be contagious. Patient is not having any symptoms but wife and patient insist on testing patient for this. Does he need an appointment to discuss further? E-visit?    Selena Dumas RN on 4/18/2024 at 1:34 PM

## 2024-04-19 NOTE — TELEPHONE ENCOUNTER
There is no indication to test for H pylori in a person who is asymptomatic, even if there is potential exposure. If he would like to discuss further, would recommend virtual visit (not e-visit).    Thanks!  Lorraine Salgado PA-C

## 2024-04-19 NOTE — TELEPHONE ENCOUNTER
Called pt and relayed provider message below. Patient was given an opportunity to ask questions, verbalized understanding of plan, and is agreeable.     VV scheduled per PCP's request.     Cherie Renee RN      Appointments in Next Year      Apr 22, 2024 11:30 AM  (Arrive by 11:25 AM)  Provider Visit with Lorraine Salgado PA-C  [facility] ([facility] ) [phone]

## 2024-04-22 ENCOUNTER — VIRTUAL VISIT (OUTPATIENT)
Dept: FAMILY MEDICINE | Facility: CLINIC | Age: 74
End: 2024-04-22
Payer: COMMERCIAL

## 2024-04-22 DIAGNOSIS — R12 HEARTBURN: Primary | ICD-10-CM

## 2024-04-22 DIAGNOSIS — C76.0 MALIGNANT NEOPLASM OF HEAD, FACE, AND NECK (H): ICD-10-CM

## 2024-04-22 DIAGNOSIS — Z85.828 HISTORY OF BASAL CELL CARCINOMA: ICD-10-CM

## 2024-04-22 DIAGNOSIS — I47.10 SVT (SUPRAVENTRICULAR TACHYCARDIA) (H): ICD-10-CM

## 2024-04-22 PROBLEM — R55 SYNCOPE: Status: RESOLVED | Noted: 2019-07-01 | Resolved: 2024-04-22

## 2024-04-22 PROCEDURE — 99213 OFFICE O/P EST LOW 20 MIN: CPT | Mod: 95 | Performed by: PHYSICIAN ASSISTANT

## 2024-04-22 NOTE — PROGRESS NOTES
Ignacio is a 73 year old who is being evaluated via a billable video visit.    How would you like to obtain your AVS? MyChart  If the video visit is dropped, the invitation should be resent by: Text to cell phone: [phone]  Will anyone else be joining your video visit? No      Assessment & Plan     Heartburn  Wife diagnosed with H pylori, completed treatment today  They were told that he should be tested as well for H pylori given his wife's diagnosis  No recent antibiotics; he does not take PPI  Occasional heartburn/bloating, takes tums about once a month  - Helicobacter pylori Antigen Stool; Future    SVT (supraventricular tachycardia) (H24)  2019 he was evaluated by cardiology   He had a 1 week ZIO monitor with his primary physician that showed asymptomatic SVT as well as second-degree type I AV block when sleeping at night or napping in the afternoon.  He denies further symptoms.     Malignant neoplasm of head, face, and neck (H)  History of basal cell carcinoma  L scalp; s/p Mohs 12/2023. Follows with dermatology regularly.      Subjective   Ignacio is a 73 year old, presenting for the following health issues:  Consult        4/22/2024    11:06 AM   Additional Questions   Roomed by kb       Video Start Time: 11:50 AM    History of Present Illness       Reason for visit:  Request an order for stool test for H-pylori.  My wife is being treated for H-pyroli and it has been suggested that I get tested as it may be contagious.  Symptoms include:  Periodic bloating/constipation that leads to a what I call a blowout that is similar to diarrhea. Once a month or so for that last year or so.  Symptom intensity:  Mild  Symptom progression:  Staying the same  Had these symptoms before:  No  What makes it worse:  No.  What makes it better:  No.    He eats 2-3 servings of fruits and vegetables daily.He consumes 0 sweetened beverage(s) daily.He exercises with enough effort to increase his heart rate 30 to 60 minutes per day.   "He exercises with enough effort to increase his heart rate 5 days per week.   He is taking medications regularly.     Wife diagnosed with H pylori, completed treatment today  They were told that he should be tested as well for H pylori given his wife's diagnosis    No recent antibiotics  He does not take PPI    Occasional heartburn/bloating, takes tums about once a month  Dyspepsia noted in chart in 2018, has been mild and intermittent  Sometimes will have constipation and then a \"blowout\" diarrhea-like stool.        Objective           Vitals:  No vitals were obtained today due to virtual visit.    Physical Exam   GENERAL: alert and no distress  EYES: Eyes grossly normal to inspection.  No discharge or erythema, or obvious scleral/conjunctival abnormalities.  RESP: No audible wheeze, cough, or visible cyanosis.    SKIN: Visible skin clear. No significant rash, abnormal pigmentation or lesions.  NEURO: Cranial nerves grossly intact.  Mentation and speech appropriate for age.  PSYCH: Appropriate affect, tone, and pace of words        Video-Visit Details    Type of service:  Video Visit   Video End Time:12:07 PM  Originating Location (pt. Location): Home    Distant Location (provider location):  On-site  Platform used for Video Visit: Marisela  Signed Electronically by: Lorraine Salgado PA-C    "

## 2024-04-23 PROCEDURE — 87338 HPYLORI STOOL AG IA: CPT | Performed by: PHYSICIAN ASSISTANT

## 2024-04-24 LAB — H PYLORI AG STL QL IA: NEGATIVE

## 2024-06-14 ENCOUNTER — MYC MEDICAL ADVICE (OUTPATIENT)
Dept: FAMILY MEDICINE | Facility: CLINIC | Age: 74
End: 2024-06-14
Payer: COMMERCIAL

## 2024-06-23 ENCOUNTER — OFFICE VISIT (OUTPATIENT)
Dept: URGENT CARE | Facility: URGENT CARE | Age: 74
End: 2024-06-23
Payer: COMMERCIAL

## 2024-06-23 VITALS
HEART RATE: 86 BPM | WEIGHT: 165.6 LBS | SYSTOLIC BLOOD PRESSURE: 144 MMHG | RESPIRATION RATE: 16 BRPM | OXYGEN SATURATION: 97 % | TEMPERATURE: 101.9 F | DIASTOLIC BLOOD PRESSURE: 72 MMHG | BODY MASS INDEX: 24.1 KG/M2

## 2024-06-23 DIAGNOSIS — R50.9 FEVER IN ADULT: ICD-10-CM

## 2024-06-23 DIAGNOSIS — K12.30 STOMATITIS AND MUCOSITIS: Primary | ICD-10-CM

## 2024-06-23 DIAGNOSIS — K12.1 STOMATITIS AND MUCOSITIS: Primary | ICD-10-CM

## 2024-06-23 DIAGNOSIS — R07.0 THROAT PAIN: ICD-10-CM

## 2024-06-23 LAB
BASOPHILS # BLD AUTO: 0 10E3/UL (ref 0–0.2)
BASOPHILS NFR BLD AUTO: 0 %
DEPRECATED S PYO AG THROAT QL EIA: NEGATIVE
EOSINOPHIL # BLD AUTO: 0 10E3/UL (ref 0–0.7)
EOSINOPHIL NFR BLD AUTO: 0 %
ERYTHROCYTE [DISTWIDTH] IN BLOOD BY AUTOMATED COUNT: 13 % (ref 10–15)
FLUAV AG SPEC QL IA: NEGATIVE
FLUBV AG SPEC QL IA: NEGATIVE
GROUP A STREP BY PCR: NOT DETECTED
HCT VFR BLD AUTO: 40.9 % (ref 40–53)
HGB BLD-MCNC: 13.3 G/DL (ref 13.3–17.7)
IMM GRANULOCYTES # BLD: 0 10E3/UL
IMM GRANULOCYTES NFR BLD: 0 %
LYMPHOCYTES # BLD AUTO: 0.5 10E3/UL (ref 0.8–5.3)
LYMPHOCYTES NFR BLD AUTO: 4 %
MCH RBC QN AUTO: 30.3 PG (ref 26.5–33)
MCHC RBC AUTO-ENTMCNC: 32.5 G/DL (ref 31.5–36.5)
MCV RBC AUTO: 93 FL (ref 78–100)
MONOCYTES # BLD AUTO: 1.1 10E3/UL (ref 0–1.3)
MONOCYTES NFR BLD AUTO: 11 %
MONOCYTES NFR BLD AUTO: NEGATIVE %
NEUTROPHILS # BLD AUTO: 8.8 10E3/UL (ref 1.6–8.3)
NEUTROPHILS NFR BLD AUTO: 85 %
PLATELET # BLD AUTO: 204 10E3/UL (ref 150–450)
RBC # BLD AUTO: 4.39 10E6/UL (ref 4.4–5.9)
WBC # BLD AUTO: 10.4 10E3/UL (ref 4–11)

## 2024-06-23 PROCEDURE — 87651 STREP A DNA AMP PROBE: CPT | Performed by: PHYSICIAN ASSISTANT

## 2024-06-23 PROCEDURE — 85025 COMPLETE CBC W/AUTO DIFF WBC: CPT | Performed by: PHYSICIAN ASSISTANT

## 2024-06-23 PROCEDURE — 87804 INFLUENZA ASSAY W/OPTIC: CPT | Performed by: PHYSICIAN ASSISTANT

## 2024-06-23 PROCEDURE — 36415 COLL VENOUS BLD VENIPUNCTURE: CPT | Performed by: PHYSICIAN ASSISTANT

## 2024-06-23 PROCEDURE — 86308 HETEROPHILE ANTIBODY SCREEN: CPT | Performed by: PHYSICIAN ASSISTANT

## 2024-06-23 PROCEDURE — 99214 OFFICE O/P EST MOD 30 MIN: CPT | Performed by: PHYSICIAN ASSISTANT

## 2024-06-23 RX ORDER — ACETAMINOPHEN 325 MG/1
650 TABLET ORAL ONCE
Status: COMPLETED | OUTPATIENT
Start: 2024-06-23 | End: 2024-06-23

## 2024-06-23 RX ORDER — DEXAMETHASONE 0.5 MG/5ML
ELIXIR ORAL
Qty: 240 ML | Refills: 0 | Status: SHIPPED | OUTPATIENT
Start: 2024-06-23 | End: 2024-09-23

## 2024-06-23 RX ADMIN — ACETAMINOPHEN 650 MG: 325 TABLET ORAL at 13:25

## 2024-06-23 NOTE — PROGRESS NOTES
Assessment & Plan     Stomatitis and mucositis  Acute problem.  On exam patient is in no acute distress.  We discussed viral illness possible coxsackie viral infection.  Dexamethasone  elixir gargles are recommended today.  Patient educational information provided regarding course of symptoms.  Advised to keep monitoring symptoms.  Follow-up if any worsening symptoms.  Patient agrees with the plan.  - dexAMETHasone (DECADRON) 0.5 MG/5ML elixir  Dispense: 240 mL; Refill: 0    Throat pain  RST is negative today.  We discussed viral illness.  Throat culture is pending.  Mononucleosis screen is negative.  Tylenol or Motrin as needed for pain or discomfort.  Follow-up if any worsening symptoms.  Patient agrees with the plan.  - Streptococcus A Rapid Screen w/Reflex to PCR - Clinic Collect  - Influenza A & B Antigen - Clinic Collect  - Mononucleosis screen  - Group A Streptococcus PCR Throat Swab      Fever in adult  Patient is given 650 mg of Tylenol here today prior to discharge.  We discussed fever in the setting of viral illness.  CBC reveals normal white blood cell count.  Recommend alternating Tylenol/Motrin as needed for fever.  Advised to keep monitoring symptoms.  Follow-up if any worsening symptoms.  Patient agrees with the plan.  - acetaminophen (TYLENOL) tablet 650 mg  - Influenza A & B Antigen - Clinic Collect  - CBC with platelets and differential  - Mononucleosis screen       Return in about 5 days (around 6/28/2024) for Symptoms failing to improve.    Arpita Monroy PA-C  [facility] BAKARI Pierre is a 73 year old male who presents to clinic today for the following health issues:  Chief Complaint   Patient presents with    Throat Pain     74 yo M presents with the following complaint chills,fever 102, throat pain mouth and gums are sore vomiting  onset Thursday  tx- aspirin 81mg         HPI    Patient is presenting to urgent care today with complaint of sore throat,  sores in the mouth, fever, body aches, chills.  Onset of symptoms 3 days ago.  No cough.  No diarrhea.  Vomited once today after trying to eat some breakfast.  Treatment tried: Aspirin.  No known sick contacts.      Review of Systems  Constitutional, HEENT, cardiovascular, pulmonary, GI, , musculoskeletal, neuro, skin, endocrine and psych systems are negative, except as otherwise noted.      Objective    BP (!) 144/72   Pulse 86   Temp (!) 101.9  F (38.8  C)   Resp 16   Wt 75.1 kg (165 lb 9.6 oz)   SpO2 97%   BMI 24.10 kg/m    Physical Exam   GENERAL: alert and no distress  HENT: ear canals and TM's normal, mouth with scattered 1-2 mm shallow ulcerations noted posterior pharynx, on his tongue, soft palate and inside his cheeks, uvula is midline, mild posterior pharynx inflammation  RESP: lungs clear to auscultation - no rales, rhonchi or wheezes  CV: regular rate and rhythm, normal S1 S2  MS: no gross musculoskeletal defects noted, no edema  SKIN: no suspicious lesions or rashes    Results for orders placed or performed in visit on 06/23/24 (from the past 24 hour(s))   Streptococcus A Rapid Screen w/Reflex to PCR - Clinic Collect    Specimen: Throat; Swab   Result Value Ref Range    Group A Strep antigen Negative Negative   Influenza A & B Antigen - Clinic Collect    Specimen: Nose; Swab   Result Value Ref Range    Influenza A antigen Negative Negative    Influenza B antigen Negative Negative    Narrative    Test results must be correlated with clinical data. If necessary, results should be confirmed by a molecular assay or viral culture.   CBC with platelets and differential    Narrative    The following orders were created for panel order CBC with platelets and differential.  Procedure                               Abnormality         Status                     ---------                               -----------         ------                     CBC with platelets and d...[[id number]]  Abnormal             Final result                 Please view results for these tests on the individual orders.   Mononucleosis screen   Result Value Ref Range    Mononucleosis Screen Negative Negative   CBC with platelets and differential   Result Value Ref Range    WBC Count 10.4 4.0 - 11.0 10e3/uL    RBC Count 4.39 (L) 4.40 - 5.90 10e6/uL    Hemoglobin 13.3 13.3 - 17.7 g/dL    Hematocrit 40.9 40.0 - 53.0 %    MCV 93 78 - 100 fL    MCH 30.3 26.5 - 33.0 pg    MCHC 32.5 31.5 - 36.5 g/dL    RDW 13.0 10.0 - 15.0 %    Platelet Count 204 150 - 450 10e3/uL    % Neutrophils 85 %    % Lymphocytes 4 %    % Monocytes 11 %    % Eosinophils 0 %    % Basophils 0 %    % Immature Granulocytes 0 %    Absolute Neutrophils 8.8 (H) 1.6 - 8.3 10e3/uL    Absolute Lymphocytes 0.5 (L) 0.8 - 5.3 10e3/uL    Absolute Monocytes 1.1 0.0 - 1.3 10e3/uL    Absolute Eosinophils 0.0 0.0 - 0.7 10e3/uL    Absolute Basophils 0.0 0.0 - 0.2 10e3/uL    Absolute Immature Granulocytes 0.0 <=0.4 10e3/uL

## 2024-06-24 ENCOUNTER — MYC MEDICAL ADVICE (OUTPATIENT)
Dept: FAMILY MEDICINE | Facility: CLINIC | Age: 74
End: 2024-06-24
Payer: COMMERCIAL

## 2024-06-25 ENCOUNTER — TELEPHONE (OUTPATIENT)
Dept: FAMILY MEDICINE | Facility: CLINIC | Age: 74
End: 2024-06-25

## 2024-06-25 ENCOUNTER — OFFICE VISIT (OUTPATIENT)
Dept: PEDIATRICS | Facility: CLINIC | Age: 74
End: 2024-06-25
Payer: COMMERCIAL

## 2024-06-25 VITALS
RESPIRATION RATE: 16 BRPM | WEIGHT: 165 LBS | OXYGEN SATURATION: 99 % | BODY MASS INDEX: 23.62 KG/M2 | DIASTOLIC BLOOD PRESSURE: 72 MMHG | TEMPERATURE: 97.8 F | HEART RATE: 60 BPM | HEIGHT: 70 IN | SYSTOLIC BLOOD PRESSURE: 134 MMHG

## 2024-06-25 DIAGNOSIS — Z23 NEED FOR SHINGLES VACCINE: ICD-10-CM

## 2024-06-25 DIAGNOSIS — Z29.11 NEED FOR VACCINATION AGAINST RESPIRATORY SYNCYTIAL VIRUS: ICD-10-CM

## 2024-06-25 DIAGNOSIS — K12.0 APHTHOUS STOMATITIS: Primary | ICD-10-CM

## 2024-06-25 PROCEDURE — 99213 OFFICE O/P EST LOW 20 MIN: CPT | Performed by: INTERNAL MEDICINE

## 2024-06-25 RX ORDER — RESPIRATORY SYNCYTIAL VIRUS VACCINE 120MCG/0.5
0.5 KIT INTRAMUSCULAR ONCE
Qty: 1 EACH | Refills: 0 | Status: CANCELLED | OUTPATIENT
Start: 2024-06-25 | End: 2024-06-25

## 2024-06-25 ASSESSMENT — PAIN SCALES - GENERAL: PAINLEVEL: EXTREME PAIN (8)

## 2024-06-25 NOTE — PATIENT INSTRUCTIONS
1:1:1   Viscous lidocaine 2%, Benadryl (diphenhydramine) liquid, and maalox.    May also continue the dexamethasone up to every 2 hours as needed.    Sharath Fischer MD  Internal Medicine and Pediatrics

## 2024-06-25 NOTE — PROGRESS NOTES
"  Assessment & Plan         Aphthous stomatitis  Symptoms significant, but not severe on my exam.  No signs of extension to other mucous membranes.  Will add in magic mouthwash and follow up for worsening.  Symptoms seem to \"have already moved forward\" in his mouth.   - magic mouthwash suspension (diphenhydrAMINE, lidocaine, aluminum-magnesium & simethicone); Swish and spit 10 mLs in mouth every 6 hours as needed for mouth sores        MED REC REQUIRED  Post Medication Reconciliation Status:  Patient was not discharged from an inpatient facility or TCU    See Patient Instructions    Lizy Pierre is a 73 year old, presenting for the following health issues:  RECHECK        6/25/2024     3:22 PM   Additional Questions   Roomed by JEROD Fish   Accompanied by Wife Carol         6/25/2024     3:22 PM   Patient Reported Additional Medications   Patient reports taking the following new medications no     HPI         Hospital Follow-up Visit:    Hospital/Nursing Home/IP Rehab Facility:  [facility]  Date of Admission: 6/23/2024  Date of Discharge: 6/23/2024  Reason(s) for Admission: Stomatitis and mucositis   Was the patient in the ICU or did the patient experience delirium during hospitalization?  No  Do you have any other stressors you would like to discuss with your provider? No    Problems taking medications regularly:  None  Medication changes since discharge: None  Problems adhering to non-medication therapy:  None    Summary of hospitalization:  [facility] discharge summary reviewed  Diagnostic Tests/Treatments reviewed.  Follow up needed: none  Other Healthcare Providers Involved in Patient s Care:         None  Update since discharge: improved.       Began with mouth sores about 4 days ago; Went to urgent care 2 days ago and diagnosed with stomatitis.      Took a negative strep test, mono test, flu test.    Given dexamethasone elixir.     Plan of care " "communicated with patient                   Review of Systems  Constitutional, HEENT, cardiovascular, pulmonary, GI, , musculoskeletal, neuro, skin, endocrine and psych systems are negative, except as otherwise noted.      Objective    /72 (BP Location: Right arm, Patient Position: Sitting, Cuff Size: Adult Regular)   Pulse 60   Temp 97.8  F (36.6  C) (Tympanic)   Resp 16   Ht 1.78 m (5' 10.08\")   Wt 74.8 kg (165 lb)   SpO2 99%   BMI 23.62 kg/m    Body mass index is 23.62 kg/m .  Physical Exam   GENERAL: alert and no distress  EYES: Eyes grossly normal to inspection, PERRL and conjunctivae and sclerae normal  HENT: ear canals and TM's normal, scattered discrete white ulceration on gums, buccal mucosa, and inner lips.   NECK: no adenopathy, no asymmetry, masses, or scars  RESP: lungs clear to auscultation - no rales, rhonchi or wheezes  CV: regular rate and rhythm, normal S1 S2, no S3 or S4, no murmur, click or rub, no peripheral edema  ABDOMEN: soft, nontender, no hepatosplenomegaly, no masses and bowel sounds normal  MS: no gross musculoskeletal defects noted, no edema  SKIN: no suspicious lesions or rashes  NEURO: Normal strength and tone, mentation intact and speech normal  PSYCH: mentation appears normal, affect normal/bright            Signed Electronically by: Sharath Fischer MD    "

## 2024-06-25 NOTE — TELEPHONE ENCOUNTER
See telephone encounter 6/25/24. Patient was seen for visit today.     David AUGUSTE RN 6/25/2024 at 4:21 PM

## 2024-06-25 NOTE — TELEPHONE ENCOUNTER
"S-(situation): Received call from patient and patient's spouse. Spouse wants appointment today for evaluation and follow up from UC visit.     B-(background): Patient was seen in UC 6/23/24 r/t throat pain, stomatitis and mucositis.     A-(assessment): Patient has developed sores on outside of his mouth. Temp is 98.4 via oral thermometer now. Patient's spouse reports it is hard for patient to talk due to throat pain, wants patient seen today. When asked if any other new/worsening symptoms, spouse stated \"how many new symptoms do we need to be seen today\".     R-(recommendations): Per  follow up notes, \"Return in about 5 days (around 6/28/2024) for Symptoms failing to improve\". Spouse requesting appointment today. Scheduled for OV 6/25/24 for further evaluation. No further questions or concerns at this time.     David AUGUSTE RN 6/25/2024 at 12:15 PM    "

## 2024-09-23 ENCOUNTER — ANCILLARY PROCEDURE (OUTPATIENT)
Dept: GENERAL RADIOLOGY | Facility: CLINIC | Age: 74
End: 2024-09-23
Attending: PHYSICIAN ASSISTANT
Payer: COMMERCIAL

## 2024-09-23 ENCOUNTER — HOSPITAL ENCOUNTER (OUTPATIENT)
Dept: CT IMAGING | Facility: CLINIC | Age: 74
Discharge: HOME | End: 2024-09-23
Attending: PHYSICIAN ASSISTANT | Admitting: PHYSICIAN ASSISTANT
Payer: COMMERCIAL

## 2024-09-23 ENCOUNTER — OFFICE VISIT (OUTPATIENT)
Dept: URGENT CARE | Facility: URGENT CARE | Age: 74
End: 2024-09-23
Payer: COMMERCIAL

## 2024-09-23 ENCOUNTER — OFFICE VISIT (OUTPATIENT)
Dept: PEDIATRICS | Facility: CLINIC | Age: 74
End: 2024-09-23
Attending: PHYSICIAN ASSISTANT
Payer: COMMERCIAL

## 2024-09-23 VITALS
RESPIRATION RATE: 18 BRPM | HEART RATE: 82 BPM | OXYGEN SATURATION: 99 % | SYSTOLIC BLOOD PRESSURE: 163 MMHG | DIASTOLIC BLOOD PRESSURE: 81 MMHG | WEIGHT: 162 LBS | TEMPERATURE: 98.2 F | BODY MASS INDEX: 23.19 KG/M2

## 2024-09-23 VITALS
DIASTOLIC BLOOD PRESSURE: 84 MMHG | RESPIRATION RATE: 18 BRPM | SYSTOLIC BLOOD PRESSURE: 165 MMHG | OXYGEN SATURATION: 99 % | TEMPERATURE: 98.4 F | HEART RATE: 68 BPM

## 2024-09-23 DIAGNOSIS — I10 ESSENTIAL (PRIMARY) HYPERTENSION: ICD-10-CM

## 2024-09-23 DIAGNOSIS — R79.89 POSITIVE D DIMER: ICD-10-CM

## 2024-09-23 DIAGNOSIS — R06.9 UNSPECIFIED ABNORMALITIES OF BREATHING: ICD-10-CM

## 2024-09-23 DIAGNOSIS — R07.89 ATYPICAL CHEST PAIN: ICD-10-CM

## 2024-09-23 DIAGNOSIS — R07.9 CHEST PAIN, UNSPECIFIED TYPE: Primary | ICD-10-CM

## 2024-09-23 DIAGNOSIS — R07.89 ATYPICAL CHEST PAIN: Primary | ICD-10-CM

## 2024-09-23 DIAGNOSIS — K76.9 LIVER LESION: ICD-10-CM

## 2024-09-23 LAB
ALBUMIN SERPL BCG-MCNC: 4.7 G/DL (ref 3.5–5.2)
ALBUMIN UR-MCNC: NEGATIVE MG/DL
ALP SERPL-CCNC: 76 U/L (ref 40–150)
ALT SERPL W P-5'-P-CCNC: 19 U/L (ref 0–70)
ANION GAP SERPL CALCULATED.3IONS-SCNC: 12 MMOL/L (ref 7–15)
APPEARANCE UR: CLEAR
AST SERPL W P-5'-P-CCNC: 18 U/L (ref 0–45)
BASOPHILS # BLD AUTO: 0 10E3/UL (ref 0–0.2)
BASOPHILS NFR BLD AUTO: 0 %
BILIRUB SERPL-MCNC: 0.7 MG/DL
BILIRUB UR QL STRIP: NEGATIVE
BUN SERPL-MCNC: 12.7 MG/DL (ref 8–23)
CALCIUM SERPL-MCNC: 9.7 MG/DL (ref 8.8–10.4)
CHLORIDE SERPL-SCNC: 99 MMOL/L (ref 98–107)
COLOR UR AUTO: YELLOW
CREAT SERPL-MCNC: 0.74 MG/DL (ref 0.67–1.17)
CRP SERPL-MCNC: <3 MG/L
D DIMER PPP FEU-MCNC: 0.73 UG/ML FEU (ref 0–0.5)
EGFRCR SERPLBLD CKD-EPI 2021: >90 ML/MIN/1.73M2
EOSINOPHIL # BLD AUTO: 0 10E3/UL (ref 0–0.7)
EOSINOPHIL NFR BLD AUTO: 0 %
ERYTHROCYTE [DISTWIDTH] IN BLOOD BY AUTOMATED COUNT: 13.4 % (ref 10–15)
ERYTHROCYTE [SEDIMENTATION RATE] IN BLOOD BY WESTERGREN METHOD: 8 MM/HR (ref 0–20)
GLUCOSE SERPL-MCNC: 111 MG/DL (ref 70–99)
GLUCOSE UR STRIP-MCNC: NEGATIVE MG/DL
HCO3 SERPL-SCNC: 28 MMOL/L (ref 22–29)
HCT VFR BLD AUTO: 43.9 % (ref 40–53)
HGB BLD-MCNC: 14.5 G/DL (ref 13.3–17.7)
HGB UR QL STRIP: NEGATIVE
IMM GRANULOCYTES # BLD: 0 10E3/UL
IMM GRANULOCYTES NFR BLD: 0 %
KETONES UR STRIP-MCNC: ABNORMAL MG/DL
LEUKOCYTE ESTERASE UR QL STRIP: NEGATIVE
LIPASE SERPL-CCNC: 26 U/L (ref 13–60)
LYMPHOCYTES # BLD AUTO: 1 10E3/UL (ref 0.8–5.3)
LYMPHOCYTES NFR BLD AUTO: 14 %
MCH RBC QN AUTO: 30.6 PG (ref 26.5–33)
MCHC RBC AUTO-ENTMCNC: 33 G/DL (ref 31.5–36.5)
MCV RBC AUTO: 93 FL (ref 78–100)
MONOCYTES # BLD AUTO: 0.3 10E3/UL (ref 0–1.3)
MONOCYTES NFR BLD AUTO: 4 %
MUCOUS THREADS #/AREA URNS LPF: PRESENT /LPF
NEUTROPHILS # BLD AUTO: 5.6 10E3/UL (ref 1.6–8.3)
NEUTROPHILS NFR BLD AUTO: 81 %
NITRATE UR QL: NEGATIVE
NRBC # BLD AUTO: 0 10E3/UL
NRBC BLD AUTO-RTO: 0 /100
NT-PROBNP SERPL-MCNC: 163 PG/ML (ref 0–900)
PH UR STRIP: 5 [PH] (ref 5–7)
PLATELET # BLD AUTO: 293 10E3/UL (ref 150–450)
POTASSIUM SERPL-SCNC: 4.1 MMOL/L (ref 3.4–5.3)
PROT SERPL-MCNC: 7.5 G/DL (ref 6.4–8.3)
RBC # BLD AUTO: 4.74 10E6/UL (ref 4.4–5.9)
RBC URINE: 1 /HPF
SODIUM SERPL-SCNC: 139 MMOL/L (ref 135–145)
SP GR UR STRIP: 1.02 (ref 1–1.03)
TROPONIN T SERPL HS-MCNC: <6 NG/L
TSH SERPL DL<=0.005 MIU/L-ACNC: 1.17 UIU/ML (ref 0.3–4.2)
UROBILINOGEN UR STRIP-MCNC: NORMAL MG/DL
WBC # BLD AUTO: 6.9 10E3/UL (ref 4–11)
WBC URINE: 1 /HPF

## 2024-09-23 PROCEDURE — 84443 ASSAY THYROID STIM HORMONE: CPT | Performed by: PHYSICIAN ASSISTANT

## 2024-09-23 PROCEDURE — 71275 CT ANGIOGRAPHY CHEST: CPT

## 2024-09-23 PROCEDURE — 86140 C-REACTIVE PROTEIN: CPT | Performed by: PHYSICIAN ASSISTANT

## 2024-09-23 PROCEDURE — 83690 ASSAY OF LIPASE: CPT | Performed by: PHYSICIAN ASSISTANT

## 2024-09-23 PROCEDURE — 99214 OFFICE O/P EST MOD 30 MIN: CPT | Performed by: PHYSICIAN ASSISTANT

## 2024-09-23 PROCEDURE — 80053 COMPREHEN METABOLIC PANEL: CPT | Performed by: PHYSICIAN ASSISTANT

## 2024-09-23 PROCEDURE — 250N000011 HC RX IP 250 OP 636: Performed by: PHYSICIAN ASSISTANT

## 2024-09-23 PROCEDURE — 85025 COMPLETE CBC W/AUTO DIFF WBC: CPT | Performed by: PHYSICIAN ASSISTANT

## 2024-09-23 PROCEDURE — 250N000009 HC RX 250: Performed by: PHYSICIAN ASSISTANT

## 2024-09-23 PROCEDURE — 36415 COLL VENOUS BLD VENIPUNCTURE: CPT | Performed by: PHYSICIAN ASSISTANT

## 2024-09-23 PROCEDURE — 84484 ASSAY OF TROPONIN QUANT: CPT | Performed by: PHYSICIAN ASSISTANT

## 2024-09-23 PROCEDURE — 71046 X-RAY EXAM CHEST 2 VIEWS: CPT | Mod: TC | Performed by: STUDENT IN AN ORGANIZED HEALTH CARE EDUCATION/TRAINING PROGRAM

## 2024-09-23 PROCEDURE — 85379 FIBRIN DEGRADATION QUANT: CPT | Performed by: PHYSICIAN ASSISTANT

## 2024-09-23 PROCEDURE — 85652 RBC SED RATE AUTOMATED: CPT | Performed by: PHYSICIAN ASSISTANT

## 2024-09-23 PROCEDURE — 83880 ASSAY OF NATRIURETIC PEPTIDE: CPT | Performed by: PHYSICIAN ASSISTANT

## 2024-09-23 PROCEDURE — 93000 ELECTROCARDIOGRAM COMPLETE: CPT | Performed by: PHYSICIAN ASSISTANT

## 2024-09-23 PROCEDURE — 99207 PR FIRST ORDER ACUTE REFERRAL: CPT | Performed by: PHYSICIAN ASSISTANT

## 2024-09-23 PROCEDURE — 81001 URINALYSIS AUTO W/SCOPE: CPT | Performed by: PHYSICIAN ASSISTANT

## 2024-09-23 RX ORDER — METHYLPREDNISOLONE 4 MG
TABLET, DOSE PACK ORAL
Qty: 21 TABLET | Refills: 0 | Status: SHIPPED | OUTPATIENT
Start: 2024-09-23

## 2024-09-23 RX ORDER — IOPAMIDOL 755 MG/ML
500 INJECTION, SOLUTION INTRAVASCULAR ONCE
Status: COMPLETED | OUTPATIENT
Start: 2024-09-23 | End: 2024-09-23

## 2024-09-23 RX ADMIN — SODIUM CHLORIDE 88 ML: 9 INJECTION, SOLUTION INTRAVENOUS at 15:33

## 2024-09-23 RX ADMIN — IOPAMIDOL 67 ML: 755 INJECTION, SOLUTION INTRAVENOUS at 15:33

## 2024-09-23 NOTE — PROGRESS NOTES
I have taken over visit care of Mr. Sarmiento after Joanna Palacios left the clinic this afternoon.   CT of the chest was done due to atypical symptoms and borderline positive d-dimer.    CT CHEST shows:  1.  No pulmonary emboli. No acute findings in the chest.  2.  An indeterminate lesion in the left lobe the liver measuring 4.6 x 4.4 cm. Nonemergent follow-up liver MRI in 1-3 weeks is recommended for further evaluation.  3.  A 2 mm nodule in the left lower lobe. A follow-up chest CT in one year should be considered.    D-dimer 0.73, glucose 111.   All other labs normal including CBC, CMP, CRP, ESR.    ASSESSMENT:     Sweats with atypical chest pain, milld.  EKG normal, troponin negative and symptoms not suggestive of angina.  CT demonstrates no PE or clear cause for symptoms.   CXR negative.  No genitourinary symptoms, no other clear infectious cause for symptoms.   Overall, rule out costochondritis, GI origin, pleurisy, etc.      2.  Elevated blood pressure.   Patient reports history of white coat hypertension and blood pressure is often high in clinic.   He has cuff at home and will recheck, with follow-up planned if not back to normal.  3.  Liver lesion, undetermined  4.  Small lung nodule    PLAN:  1.  Medrol dosepack, take with food  2.  Notify PCP if symptoms change/worsen, or do not resolve  3.  Plan to repeat CT chest in a year  4.  MRI of liver in a couple weeks  5.  Recheck blood pressure on own, report to PCP

## 2024-09-23 NOTE — RESULT ENCOUNTER NOTE
Results discussed directly with patient while patient was present. Any further details documented in the note.   Joanna Palacios PA-C

## 2024-09-23 NOTE — Clinical Note
Lorraine, We saw Mr. Sarmiento yesterday at the [facility] clinic.   What a nice tequila.  He is having sweats and left parasternal area pain.   Please see notes from Joanna Palacios and myself (Joanna had to leave before the visit was done.  While I'm expecting his symptoms to resolve, there are two things which need follow-up from his CT scan:  1.  Abnormality in the liver.   I have ordered a follow-up MRI, the results will go to you. 2.  Probably benign nodule in the lung.   He needs a follow-up CT scan in a year.  Byron

## 2024-09-23 NOTE — PROGRESS NOTES
Patient with a 2-day history of on and off chest pain.  Patient states its located in the central chest sometimes off to the left.  He has felt some dizziness on occasion for the past 2 days along with some pain into the left shoulder and some mild nausea.  Had a normal echo in 2019.  Pain does radiate to his back at times.  Patient is active and does run.  States he just does not feel right.  Former smoker.  EKG performed in the clinic with no acute ST-T changes.  Further evaluation with acute and diagnostic    Referral to Acute and Diagnostic Services    The [facility] has been contacted at [phone] ([place]) to confirm patient acceptance. The transition to Acute & Diagnostic Services Clinic has been discussed with patient, and he agrees with next level of care.  Patient understands that evaluation/treatment at [facility] typically takes significantly longer than in clinic/urgent care (>2 hours).        Special issues:  None                Referral placed: Yes  Patient has transportation arranged and will travel to the [facility] without delay: Yes  Patient aware not to eat or drink. Yes    The following provider has assessed this patient for intervention at [facility], and directed the patient for referral: DAYO Sandoval PA-C

## 2024-09-23 NOTE — PROGRESS NOTES
"Acute and Diagnostic Services Clinic Visit    Assessment & Plan     Atypical chest pain  Unspecified abnormalities of breathing  Essential (primary) hypertension  Positive D dimer  Stat labs overall reassuring aside from positive D-dimer warranting CT pulmonary embolism evaluation.  Exam reassuring in the clinic and likely suspect for costochondritis.  Upon my departure and transitioning care to Dr. Byron Pierre - further care/plan pending CT PE scan.  Chest x-ray was reassuring.  Further assessment and plan pending diagnostic results and to be determined by Dr. Byron Pierre.  - XR Chest 2 Views  - UA with Microscopic reflex to Culture  - sodium chloride (PF) 0.9% PF flush 3 mL  - BNP-N terminal pro  - D dimer quantitative  - CBC with platelets differential  - Comprehensive metabolic panel  - Lipase  - TSH with free T4 reflex  - Troponin T, High Sensitivity  - Erythrocyte sedimentation rate auto  - CRP inflammation  - CT Chest Pulmonary Embolism w Contrast  - methylPREDNISolone (MEDROL DOSEPAK) 4 MG tablet therapy pack  Dispense: 21 tablet; Refill: 0    Liver lesion  Ordered by Dr. Matthews E alert based on incidental liver lesion found on CT.  - MR Liver wo & w Contrast      42 minutes spent by me on the date of the encounter doing chart review, history and exam, documentation and further activities per the note        Return in about 8 days (around 10/1/2024) for PCP follow up BP.      Joanna Palacios MBA, MS, PA-C  [facility]      Lizy Pierre is a 74 year old, presenting for the following health issues:  Chest Pain (Intermittent in the last 3 days-left center chest)    HPI     Chest Pain  Onset/Duration: X 3 days  Description:   Location: left side-center  Character: dull ache  Radiation: back  Duration: 1-2 minutes   Intensity: 2/10  Progression of Symptoms: worsening  Accompanying Signs & Symptoms:  Shortness of breath: No  Sweating: YES- \"periodically\"  Nausea/vomiting: " "No  Lightheadedness: YES- \"periodically\"  Palpitations: No  Fever/Chills: No  Cough: No           Heartburn: No  History:   Family history of heart disease: YES- paternal grandfather MI  Tobacco use: former  Previous similar symptoms: no   Precipitating factors:   Worse with exertion: No  Worse with deep breaths: YES- notices some increased pain with deep breaths            Related to eating: No           Better with burping: No  Alleviating factors: none  Therapies tried and outcome: rest/inactivity, took Baby Aspirin today        Review of Systems  Constitutional, HEENT, cardiovascular, pulmonary, GI, , musculoskeletal, neuro, skin, endocrine and psych systems are negative, except as otherwise noted.      Objective    BP (!) 163/81 (BP Location: Right arm, Patient Position: Chair, Cuff Size: Adult Regular)   Pulse 82   Temp 98.2  F (36.8  C) (Oral)   Resp 18   Wt 73.5 kg (162 lb)   SpO2 99%   BMI 23.19 kg/m    Body mass index is 23.19 kg/m .  Physical Exam   GENERAL: alert and no distress  EYES: Eyes grossly normal to inspection, PERRL and conjunctivae and sclerae normal  HENT: ear canals and TM's normal, nose and mouth without ulcers or lesions  NECK: no adenopathy, no asymmetry, masses, or scars  RESP: lungs clear to auscultation - no rales, rhonchi or wheezes  CV: regular rate and rhythm, normal S1 S2, no S3 or S4, no murmur, click or rub, no peripheral edema  ABDOMEN: soft, nontender, no hepatosplenomegaly, no masses and bowel sounds normal  MS: no gross musculoskeletal defects noted, no edema, tenderness to palpation of anterior left chest wall with gentle palpation.  No flail chest and no bony step offs appreciated.  SKIN: no suspicious lesions or rashes  NEURO: Normal strength and tone, mentation intact and speech normal  PSYCH: mentation appears normal, affect normal/bright    Results for orders placed or performed during the hospital encounter of 09/23/24   CT Chest Pulmonary Embolism w Contrast   "   Status: None    Narrative    CT CHEST PULMONARY EMBOLISM W CONTRAST 9/23/2024 3:41 PM    CLINICAL HISTORY: diaphoresis, chest pain, + d dimer; Atypical chest  pain; Unspecified abnormalities of breathing; Positive D dimer  TECHNIQUE: CT angiogram chest during arterial phase injection IV  contrast. 2D and 3D MIP reconstructions were performed by the CT  technologist. Dose reduction techniques were used.     CONTRAST: 67mL Isovue-370    COMPARISON: Chest x-ray earlier today    FINDINGS:  ANGIOGRAM CHEST: Pulmonary arteries are normal caliber and negative  for pulmonary emboli. Thoracic aorta is negative for dissection. No CT  evidence of right heart strain.    LUNGS AND PLEURA: No infiltrate or pleural effusion. No suspicious  pulmonary nodules or masses. A 2 mm left lower lobe subpleural nodule  (series 7, image 115).    MEDIASTINUM/AXILLAE: No lymphadenopathy. No thoracic aortic aneurysm.  Mild coronary artery desiccation. No pericardial effusion.    UPPER ABDOMEN: Indeterminate 4.6 x 4.4 cm lesion in the left hepatic  lobe is hypoenhancing. There are a few other cysts in the left lobe of  the liver.    MUSCULOSKELETAL: No suspicious lesions in the bones. Degenerative  changes in the spine.      Impression    IMPRESSION:  1.  No pulmonary emboli. No acute findings in the chest.  2.  An indeterminate lesion in the left lobe the liver measuring 4.6 x  4.4 cm. Nonemergent follow-up liver MRI in 1-3 weeks is recommended  for further evaluation.  3.  A 2 mm nodule in the left lower lobe. A follow-up chest CT in one  year should be considered.    KERA CANCHOLA MD         SYSTEM ID:  [id number]   Results for orders placed or performed in visit on 09/23/24   XR Chest 2 Views     Status: None    Narrative    CHEST TWO VIEWS 9/23/2024 2:05 PM     HISTORY: diaphoresis, elevated BP, atypical chest pain; Atypical chest  pain; Unspecified abnormalities of breathing; Essential (primary)  hypertension    COMPARISON: None.        Impression    IMPRESSION: No focal consolidation, pleural effusion or pneumothorax.  Cardiomediastinal silhouette is unremarkable. Remote left rib  fractures. Degenerative changes of the spine.    BARRINGTON DUMONT MD         SYSTEM ID:  [id number]   Results for orders placed or performed in visit on 09/23/24   UA with Microscopic reflex to Culture     Status: Abnormal    Specimen: Urine, Clean Catch   Result Value Ref Range    Color Urine Yellow Colorless, Straw, Light Yellow, Yellow    Appearance Urine Clear Clear    Glucose Urine Negative Negative mg/dL    Bilirubin Urine Negative Negative    Ketones Urine Trace (A) Negative mg/dL    Specific Gravity Urine 1.024 1.003 - 1.035    Blood Urine Negative Negative    pH Urine 5.0 5.0 - 7.0    Protein Albumin Urine Negative Negative mg/dL    Urobilinogen Urine Normal Normal, 2.0 mg/dL    Nitrite Urine Negative Negative    Leukocyte Esterase Urine Negative Negative    Mucus Urine Present (A) None Seen /LPF    RBC Urine 1 <=2 /HPF    WBC Urine 1 <=5 /HPF    Narrative    Urine Culture not indicated   BNP-N terminal pro     Status: Normal   Result Value Ref Range    N Terminal Pro BNP Outpatient 163 0 - 900 pg/mL   D dimer quantitative     Status: Abnormal   Result Value Ref Range    D-Dimer Quantitative 0.73 (H) 0.00 - 0.50 ug/mL FEU    Narrative    This D-dimer assay is intended for use in conjunction with a clinical pretest probability assessment model to exclude pulmonary embolism (PE) and deep venous thrombosis (DVT) in outpatients suspected of PE or DVT. The cut-off value is 0.50 ug/mL FEU.    For patients 50 years of age or older, the application of age-adjusted cut-off values for D-Dimer may increase the specificity without significant effect on sensitivity. The literature suggested calculation age adjusted cut-off in ug/L = age in years x 10 ug/L. The results in this laboratory are reported as ug/mL rather than ug/L. The calculation for age adjusted cut off in  ug/mL= age in years x 0.01 ug/mL. For example, the cut off for a 76 year old male is 76 x 0.01 ug/mL = 0.76 ug/mL (760 ug/L).    M Karissa et al. Age adjusted D-dimer cut-off levels to rule out pulmonary embolism: The ADJUST-PE Study. PAMELA 2014;311:6824-1505.; HJ Darren et al. Diagnostic accuracy of conventional or age adjusted D-dimer cutoff values in older patients with suspected venous thromboembolism. Systemic review and meta-analysis. BMJ 2013:346:f2492.   Comprehensive metabolic panel     Status: Abnormal   Result Value Ref Range    Sodium 139 135 - 145 mmol/L    Potassium 4.1 3.4 - 5.3 mmol/L    Carbon Dioxide (CO2) 28 22 - 29 mmol/L    Anion Gap 12 7 - 15 mmol/L    Urea Nitrogen 12.7 8.0 - 23.0 mg/dL    Creatinine 0.74 0.67 - 1.17 mg/dL    GFR Estimate >90 >60 mL/min/1.73m2    Calcium 9.7 8.8 - 10.4 mg/dL    Chloride 99 98 - 107 mmol/L    Glucose 111 (H) 70 - 99 mg/dL    Alkaline Phosphatase 76 40 - 150 U/L    AST 18 0 - 45 U/L    ALT 19 0 - 70 U/L    Protein Total 7.5 6.4 - 8.3 g/dL    Albumin 4.7 3.5 - 5.2 g/dL    Bilirubin Total 0.7 <=1.2 mg/dL   Lipase     Status: Normal   Result Value Ref Range    Lipase 26 13 - 60 U/L   TSH with free T4 reflex     Status: Normal   Result Value Ref Range    TSH 1.17 0.30 - 4.20 uIU/mL   Troponin T, High Sensitivity     Status: Normal   Result Value Ref Range    Troponin T, High Sensitivity <6 <=22 ng/L   Erythrocyte sedimentation rate auto     Status: Normal   Result Value Ref Range    Erythrocyte Sedimentation Rate 8 0 - 20 mm/hr   CRP inflammation     Status: Normal   Result Value Ref Range    CRP Inflammation <3.00 <5.00 mg/L   CBC with platelets and differential     Status: None   Result Value Ref Range    WBC Count 6.9 4.0 - 11.0 10e3/uL    RBC Count 4.74 4.40 - 5.90 10e6/uL    Hemoglobin 14.5 13.3 - 17.7 g/dL    Hematocrit 43.9 40.0 - 53.0 %    MCV 93 78 - 100 fL    MCH 30.6 26.5 - 33.0 pg    MCHC 33.0 31.5 - 36.5 g/dL    RDW 13.4 10.0 - 15.0 %    Platelet Count  293 150 - 450 10e3/uL    % Neutrophils 81 %    % Lymphocytes 14 %    % Monocytes 4 %    % Eosinophils 0 %    % Basophils 0 %    % Immature Granulocytes 0 %    NRBCs per 100 WBC 0 <1 /100    Absolute Neutrophils 5.6 1.6 - 8.3 10e3/uL    Absolute Lymphocytes 1.0 0.8 - 5.3 10e3/uL    Absolute Monocytes 0.3 0.0 - 1.3 10e3/uL    Absolute Eosinophils 0.0 0.0 - 0.7 10e3/uL    Absolute Basophils 0.0 0.0 - 0.2 10e3/uL    Absolute Immature Granulocytes 0.0 <=0.4 10e3/uL    Absolute NRBCs 0.0 10e3/uL   CBC with platelets differential     Status: None    Narrative    The following orders were created for panel order CBC with platelets differential.  Procedure                               Abnormality         Status                     ---------                               -----------         ------                     CBC with platelets and d...[[id number]]                      Final result                 Please view results for these tests on the individual orders.             Signed Electronically by: Joanna Palacios PA-C

## 2024-09-23 NOTE — PATIENT INSTRUCTIONS
PLAN:  1.  Medrol dosepack, take with food  2.  Notify PCP if symptoms change/worsen, or do not resolve  3.  Plan to repeat CT chest in a year  4.  MRI of liver in a couple weeks  5.  Recheck blood pressure on own, report to PCP

## 2024-09-24 PROBLEM — R91.8 PULMONARY NODULES: Status: ACTIVE | Noted: 2024-09-24

## 2024-09-24 PROBLEM — R93.2 ABNORMAL CT SCAN, LIVER: Status: ACTIVE | Noted: 2024-09-24

## 2024-10-13 ENCOUNTER — HOSPITAL ENCOUNTER (OUTPATIENT)
Dept: MRI IMAGING | Facility: CLINIC | Age: 74
Discharge: HOME | End: 2024-10-13
Attending: PHYSICIAN ASSISTANT | Admitting: PHYSICIAN ASSISTANT
Payer: COMMERCIAL

## 2024-10-13 DIAGNOSIS — K76.9 LIVER LESION: ICD-10-CM

## 2024-10-13 PROCEDURE — A9585 GADOBUTROL INJECTION: HCPCS | Performed by: PHYSICIAN ASSISTANT

## 2024-10-13 PROCEDURE — 74183 MRI ABD W/O CNTR FLWD CNTR: CPT

## 2024-10-13 PROCEDURE — 255N000002 HC RX 255 OP 636: Performed by: PHYSICIAN ASSISTANT

## 2024-10-13 RX ORDER — GADOBUTROL 604.72 MG/ML
7 INJECTION INTRAVENOUS ONCE
Status: COMPLETED | OUTPATIENT
Start: 2024-10-13 | End: 2024-10-13

## 2024-10-13 RX ADMIN — GADOBUTROL 7 ML: 604.72 INJECTION INTRAVENOUS at 10:48

## 2024-10-18 ENCOUNTER — MYC MEDICAL ADVICE (OUTPATIENT)
Dept: FAMILY MEDICINE | Facility: CLINIC | Age: 74
End: 2024-10-18
Payer: COMMERCIAL

## 2024-10-18 DIAGNOSIS — K76.9 LIVER LESION: Primary | ICD-10-CM

## 2024-10-18 NOTE — TELEPHONE ENCOUNTER
Routing to Lorraine. Please see pt's MCM and advise. MRI was done on 10/13.     JUAN JOSE Watson, RN     [facility]    10/18/2024 at 7:33 AM

## 2024-10-21 ENCOUNTER — TELEPHONE (OUTPATIENT)
Dept: GASTROENTEROLOGY | Facility: CLINIC | Age: 74
End: 2024-10-21
Payer: COMMERCIAL

## 2024-10-21 NOTE — TELEPHONE ENCOUNTER
[facility] Call Center    Phone Message    May a detailed message be left on voicemail: yes     Reason for Call: Other: New patient // Gen Surg referral dated  10-18 for Liver Lesion.  Called backline and was advised to schedule in Hep // please triage referral    Action Taken: Message routed to:  [facility] Hep    Travel Screening: Not Applicable     Date of Service:

## 2024-10-23 DIAGNOSIS — K76.9 LIVER LESION: Primary | ICD-10-CM

## 2024-10-23 NOTE — PROGRESS NOTES
Received message after placing general surgery referral that hepatology referral is more appropriate. Hepatology referral placed.    Lorraine Salgado PA-C

## 2024-10-24 NOTE — TELEPHONE ENCOUNTER
Hepatology referral received for liver lesion. Pt's clinical information reviewed and, per Chapis Vang PA-C, pt should see IR (non urgently) for probable benign cavernous hemangioma if symptomatic. Message sent to referring provider.

## 2024-11-04 ENCOUNTER — MYC MEDICAL ADVICE (OUTPATIENT)
Dept: FAMILY MEDICINE | Facility: CLINIC | Age: 74
End: 2024-11-04
Payer: COMMERCIAL

## 2024-11-04 NOTE — TELEPHONE ENCOUNTER
Routing to provider- patient states that GI referral not appropriate and they had said a referral it IR is requested.     Please advise.      Sierra DURAND, - [facility]  Primary Care- Yam[facility]

## 2024-11-14 SDOH — HEALTH STABILITY: PHYSICAL HEALTH: ON AVERAGE, HOW MANY DAYS PER WEEK DO YOU ENGAGE IN MODERATE TO STRENUOUS EXERCISE (LIKE A BRISK WALK)?: 3 DAYS

## 2024-11-14 SDOH — HEALTH STABILITY: PHYSICAL HEALTH: ON AVERAGE, HOW MANY MINUTES DO YOU ENGAGE IN EXERCISE AT THIS LEVEL?: 60 MIN

## 2024-11-14 ASSESSMENT — SOCIAL DETERMINANTS OF HEALTH (SDOH): HOW OFTEN DO YOU GET TOGETHER WITH FRIENDS OR RELATIVES?: THREE TIMES A WEEK

## 2024-11-15 ENCOUNTER — OFFICE VISIT (OUTPATIENT)
Dept: FAMILY MEDICINE | Facility: CLINIC | Age: 74
End: 2024-11-15
Attending: PHYSICIAN ASSISTANT
Payer: COMMERCIAL

## 2024-11-15 VITALS
SYSTOLIC BLOOD PRESSURE: 152 MMHG | RESPIRATION RATE: 13 BRPM | OXYGEN SATURATION: 100 % | HEIGHT: 71 IN | DIASTOLIC BLOOD PRESSURE: 72 MMHG | TEMPERATURE: 97.6 F | HEART RATE: 74 BPM | BODY MASS INDEX: 22.9 KG/M2 | WEIGHT: 163.6 LBS

## 2024-11-15 DIAGNOSIS — Z12.5 SCREENING FOR PROSTATE CANCER: ICD-10-CM

## 2024-11-15 DIAGNOSIS — R03.0 ELEVATED BLOOD PRESSURE READING WITHOUT DIAGNOSIS OF HYPERTENSION: ICD-10-CM

## 2024-11-15 DIAGNOSIS — R93.2 ABNORMAL CT SCAN, LIVER: ICD-10-CM

## 2024-11-15 DIAGNOSIS — D18.03 CAVERNOUS HEMANGIOMA OF LIVER: ICD-10-CM

## 2024-11-15 DIAGNOSIS — R91.8 PULMONARY NODULES: ICD-10-CM

## 2024-11-15 DIAGNOSIS — Z00.00 ENCOUNTER FOR MEDICARE ANNUAL WELLNESS EXAM: Primary | ICD-10-CM

## 2024-11-15 DIAGNOSIS — E78.5 HYPERLIPIDEMIA LDL GOAL <160: ICD-10-CM

## 2024-11-15 PROBLEM — M54.50 MIDLINE LOW BACK PAIN WITHOUT SCIATICA: Status: RESOLVED | Noted: 2018-10-19 | Resolved: 2024-11-15

## 2024-11-15 PROBLEM — M54.6 MIDLINE THORACIC BACK PAIN, UNSPECIFIED CHRONICITY: Status: RESOLVED | Noted: 2018-10-18 | Resolved: 2024-11-15

## 2024-11-15 ASSESSMENT — PAIN SCALES - GENERAL: PAINLEVEL_OUTOF10: NO PAIN (0)

## 2024-11-15 NOTE — PROGRESS NOTES
Preventive Care Visit  [facility] TIM Salgado PA-C, Family Medicine  Nov 15, 2024      Assessment & Plan     Encounter for Medicare annual wellness exam    Hyperlipidemia LDL goal <160  Reviewed lipids, elevated ASCVD score, recommendation for statin vs further eval with CT coronary calcium scan. He would like to start with CAC and would consider statin if recommended based on results.  - Lipid panel reflex to direct LDL Fasting; Future  - CT Coronary Calcium Scan; Future    Pulmonary nodule (seen on chest CT 9/2024, needs repeat chest CT in 1 year - DUE 9/2025)  Remote smoking history.   - CT Chest w/o Contrast; Future    Cavernous hemangioma of liver  Liver lesion incidentally noted on CT chest PE study 9/23/24  Follow-up liver MRI done 10/13/24 showed:  1.  A stable 10.5 x 4.8 cm lesion in the left hepatic lobe (previously 10.8 x 5.1 cm when measured using similar technique on the prior CT). This is probably a benign giant cavernous hemangioma. Given the size, hepatobiliary surgical consultation and a follow-up MRI in 3 months should be considered.    Referral was placed to hepatology but they reviewed and advised that if he was symptomatic (right upper abdominal pain, nausea, decreased appetite, feeling full faster, weight loss) they would recommend seeing interventional radiology on a non-urgent basis. If asymptomatic, then they said nothing needs to be done. He has had mild intermittent muscle ache in right side for last few days but he recently has been increasing weight lifting so feels this is related to exercise. Otherwise no abdominal pain, nausea, decreased appetite, early satiety. He has lost a few pounds over the last year but is trying to eat smaller portions intentionally. He would like to repeat MRI in 3 months.  - MR Liver wo & w Contrast; Future    Screening for prostate cancer  - PSA, screen; Future    Elevated blood pressure reading without diagnosis of  hypertension  Reviewed history of elevated BP but he says BP usually higher in clinic - he thinks due to white coat syndrome. He hasn't checked BP at home recently but when he has in the past, BP usually 134/80. He prefers to avoid medication if possible, will start checking BP at home and follow-up if elevated.      Counseling  Appropriate preventive services were discussed with this patient.  Checklist reviewing preventive services available has been given to the patient.      Lizy Pierre is a 74 year old, presenting for the following:  Medicare Visit        11/15/2024     9:13 AM   Additional Questions   Roomed by MR   Accompanied by NA         11/15/2024     9:13 AM   Patient Reported Additional Medications   Patient reports taking the following new medications NA           HPI    HTN:   Not taking any blood pressure medication  He hasn't checked BP at home recently but when he has in the past, BP usually 134/80  He prefers to avoid medication if possible  BP usually higher in clinic - he says he thinks due to white coat syndrome  No chest pain, shortness of breath, swelling in the extremities, palpitations, dizziness, headaches, blurred vision.     Regular exercise      Hyperlipidemia  He is not fasting today  He has not been interested in lipid lowering medications in the past; prefers healthy diet and regular exercise.     The 10-year ASCVD risk score (Bean DK, et al., 2019) is: 24.3%    Values used to calculate the score:      Age: 74 years      Sex: Male      Is Non- : No      Diabetic: No      Tobacco smoker: No      Systolic Blood Pressure: 152 mmHg      Is BP treated: No      HDL Cholesterol: 79 mg/dL      Total Cholesterol: 199 mg/dL    Concerns  He would like to go over recent lab results    H/o skin cancer  Follows with dermatology    Liver lesion incidentally noted on CT chest PE study 9/23/24  Follow-up liver MRI done 10/13/24 showed:  1.  A stable 10.5 x 4.8 cm  lesion in the left hepatic lobe (previously 10.8 x 5.1 cm when measured using similar technique on the prior CT). This is probably a benign giant cavernous hemangioma. Given the size, hepatobiliary surgical consultation and a follow-up MRI in 3 months should be considered.      Health Care Directive  Patient does not have a Health Care Directive: Discussed advance care planning with patient; however, patient declined at this time.      11/14/2024   General Health   How would you rate your overall physical health? Good   Feel stress (tense, anxious, or unable to sleep) Only a little      (!) STRESS CONCERN      11/14/2024   Nutrition   Diet: Regular (no restrictions)            11/14/2024   Exercise   Days per week of moderate/strenous exercise 3 days   Average minutes spent exercising at this level 60 min            11/14/2024   Social Factors   Frequency of gathering with friends or relatives Three times a week   Worry food won't last until get money to buy more No   Food not last or not have enough money for food? No   Do you have housing? (Housing is defined as stable permanent housing and does not include staying ouside in a car, in a tent, in an abandoned building, in an overnight shelter, or couch-surfing.) No   Are you worried about losing your housing? No   Lack of transportation? No   Unable to get utilities (heat,electricity)? No   Want help with housing or utility concern? No      (!) HOUSING CONCERN PRESENT      11/14/2024   Fall Risk   Fallen 2 or more times in the past year? No     No    Trouble with walking or balance? No     No        Patient-reported    Multiple values from one day are sorted in reverse-chronological order          11/14/2024   Activities of Daily Living- Home Safety   Needs help with the following daily activites None of the above   Safety concerns in the home None of the above            11/14/2024   Dental   Dentist two times every year? Yes            11/14/2024   Hearing  Screening   Hearing concerns? (!) IT'S HARD TO FOLLOW A CONVERSATION IN A NOISY RESTAURANT OR CROWDED ROOM.            2024   Driving Risk Screening   Patient/family members have concerns about driving No            2024   General Alertness/Fatigue Screening   Have you been more tired than usual lately? No            2024   Urinary Incontinence Screening   Bothered by leaking urine in past 6 months No            2024   TB Screening   Were you born outside of the [place]? No            Today's PHQ-2 Score:       2024    10:48 AM   PHQ-2 (  [employer])   Q1: Little interest or pleasure in doing things 0    Q2: Feeling down, depressed or hopeless 0    PHQ-2 Score 0    Q1: Little interest or pleasure in doing things Not at all   Q2: Feeling down, depressed or hopeless Not at all   PHQ-2 Score 0       Patient-reported           2024   Substance Use   Alcohol more than 3/day or more than 7/wk No   Do you have a current opioid prescription? No   How severe/bad is pain from 1 to 10? 1/10   Do you use any other substances recreationally? (!) CANNABIS PRODUCTS        Social History     Tobacco Use    Smoking status: Former     Current packs/day: 0.00     Average packs/day: 1.5 packs/day for 10.0 years (15.0 ttl pk-yrs)     Types: Cigarettes     Start date: 1966     Quit date: 1978     Years since quittin.9     Passive exposure: Never    Smokeless tobacco: Never    Tobacco comments:     Smoked for 8-10 years, has smoked >100 cigarettes in lifetime   Vaping Use    Vaping status: Never Used   Substance Use Topics    Alcohol use: Yes     Comment: 2 beers daily    Drug use: Not Currently     Types: Marijuana       ASCVD Risk   The 10-year ASCVD risk score (Bean JESUS, et al., 2019) is: 24.3%    Values used to calculate the score:      Age: 74 years      Sex: Male      Is Non- : No      Diabetic: No      Tobacco smoker: No      Systolic Blood Pressure: 152  mmHg      Is BP treated: No      HDL Cholesterol: 79 mg/dL      Total Cholesterol: 199 mg/dL            Reviewed and updated as needed this visit by Provider   Tobacco  Allergies  Meds  Problems  Med Hx  Surg Hx  Fam Hx            Past Medical History:   Diagnosis Date    Basal cell carcinoma     Cataracts, bilateral     Dyspepsia 10/18/2018    Elevated blood pressure reading without diagnosis of hypertension 10/18/2018    History of skin cancer 10/18/2018    Hyperlipidemia LDL goal <160 11/05/2018    Midline low back pain without sciatica 10/19/2018    Midline thoracic back pain, unspecified chronicity 10/18/2018    [place] I 08/12/2019    Primary osteoarthritis of ankle, unspecified laterality 11/09/2018    Seborrheic dermatitis 10/18/2018    Syncope 07/01/2019    Systolic hypertension 07/01/2019     Past Surgical History:   Procedure Laterality Date    ARTHROSCOPY KNEE Right     ARTHROSCOPY KNEE WITH MENISCECTOMY Left     open    COLONOSCOPY      COLONOSCOPY Left 9/22/2022    Procedure: COLONOSCOPY, FLEXIBLE, WITH POLYPECTOMIES REMOVAL USING COLD SNARE;  Surgeon: Hector Sinha MD;  Location:  GI     Labs reviewed in EPIC  BP Readings from Last 3 Encounters:   11/15/24 (!) 152/72   09/23/24 (!) 163/81   09/23/24 (!) 165/84    Wt Readings from Last 3 Encounters:   11/15/24 74.2 kg (163 lb 9.6 oz)   09/23/24 73.5 kg (162 lb)   06/25/24 74.8 kg (165 lb)                  Patient Active Problem List   Diagnosis    Seborrheic dermatitis    Dyspepsia    Hyperlipidemia LDL goal <160    Primary osteoarthritis of ankle, unspecified laterality    Systolic hypertension    [place] I    SVT (supraventricular tachycardia) (H)    History of basal cell carcinoma    Malignant neoplasm of head, face, and neck (H)    Pulmonary nodule (seen on chest CT 9/2024, needs repeat chest CT in 1 year - DUE 9/2025)    Cavernous hemangioma of liver     Past Surgical History:   Procedure Laterality Date    ARTHROSCOPY KNEE Right      ARTHROSCOPY KNEE WITH MENISCECTOMY Left     open    COLONOSCOPY      COLONOSCOPY Left 2022    Procedure: COLONOSCOPY, FLEXIBLE, WITH POLYPECTOMIES REMOVAL USING COLD SNARE;  Surgeon: Hector Sinha MD;  Location:  GI       Social History     Tobacco Use    Smoking status: Former     Current packs/day: 0.00     Average packs/day: 1.5 packs/day for 10.0 years (15.0 ttl pk-yrs)     Types: Cigarettes     Start date: 1966     Quit date: 1978     Years since quittin.9     Passive exposure: Never    Smokeless tobacco: Never    Tobacco comments:     Smoked for 8-10 years, has smoked >100 cigarettes in lifetime   Substance Use Topics    Alcohol use: Yes     Comment: 2 beers daily     Family History   Problem Relation Age of Onset    Diabetes Sister         Type 2    Colon Cancer No family hx of          Current Outpatient Medications   Medication Sig Dispense Refill    Misc Natural Products (JOINT HEALTH PO) MOVE FREE ADVANCED      Multiple Vitamin (MULTI VITAMIN DAILY PO)       vitamin B-Complex Take 1 tablet by mouth daily      methylPREDNISolone (MEDROL DOSEPAK) 4 MG tablet therapy pack Take packet in tapering dose, per protocol (Patient not taking: Reported on 11/15/2024) 21 tablet 0     No Known Allergies  Recent Labs   Lab Test 24  1426 23  0832 22  0912 22  0912 21  2132 20  0912 10/18/18  1539   A1C  --  5.6  --   --   --   --   --    LDL  --  106*  --  107*  --  105* 101*   HDL  --  79  --  83  --  81 84   TRIG  --  68  --  82  --  75 99   ALT 19  --   --  21  --  25 21   CR 0.74 0.75   < > 0.78 0.70 0.70 0.86   GFRESTIMATED >90 >90   < > >90 >90 >90 88   GFRESTBLACK  --   --   --   --  >90 >90 >90   POTASSIUM 4.1 4.0  --  4.1 4.2 3.9 3.8   TSH 1.17  --   --   --   --   --  1.43    < > = values in this interval not displayed.      Current providers sharing in care for this patient include:  Patient Care Team:  Lorraine Salgado PA-C as PCP - General (Family  "Medicine)  Michel Paniagua MD as MD (Dermatology)  Michel Paniagua MD as Assigned Surgical Provider  Lorraine Salgado PA-C as Assigned PCP    The following health maintenance items are reviewed in Epic and correct as of today:  Health Maintenance   Topic Date Due    ZOSTER IMMUNIZATION (1 of 2) Never done    RSV VACCINE (1 - Risk 60-74 years 1-dose series) Never done    LIPID  12/12/2024    BMP  09/23/2025    MEDICARE ANNUAL WELLNESS VISIT  11/15/2025    ANNUAL REVIEW OF HM ORDERS  11/15/2025    FALL RISK ASSESSMENT  11/15/2025    PSA  12/12/2025    COLORECTAL CANCER SCREENING  09/22/2027    GLUCOSE  09/23/2027    DTAP/TDAP/TD IMMUNIZATION (4 - Td or Tdap) 04/17/2029    ADVANCE CARE PLANNING  11/15/2029    HEPATITIS C SCREENING  Completed    PHQ-2 (once per calendar year)  Completed    INFLUENZA VACCINE  Completed    Pneumococcal Vaccine: 65+ Years  Completed    AORTIC ANEURYSM SCREENING (SYSTEM ASSIGNED)  Completed    COVID-19 Vaccine  Completed    HPV IMMUNIZATION  Aged Out    MENINGITIS IMMUNIZATION  Aged Out    RSV MONOCLONAL ANTIBODY  Aged Out    LUNG CANCER SCREENING  Discontinued         Review of Systems  Constitutional, neuro, ENT, endocrine, pulmonary, cardiac, gastrointestinal, genitourinary, musculoskeletal, integument and psychiatric systems are negative, except as otherwise noted.     Objective    Exam  BP (!) 152/72 (BP Location: Right arm, Patient Position: Sitting, Cuff Size: Adult Large)   Pulse 74   Temp 97.6  F (36.4  C) (Oral)   Resp 13   Ht 1.798 m (5' 10.8\")   Wt 74.2 kg (163 lb 9.6 oz)   SpO2 100%   BMI 22.95 kg/m     Estimated body mass index is 22.95 kg/m  as calculated from the following:    Height as of this encounter: 1.798 m (5' 10.8\").    Weight as of this encounter: 74.2 kg (163 lb 9.6 oz).    Physical Exam  GENERAL: alert and no distress  EYES: Eyes grossly normal to inspection, PERRL and conjunctivae and sclerae normal  HENT: ear canals and TM's normal, nose and mouth without " ulcers or lesions  NECK: no adenopathy, no asymmetry, masses, or scars  RESP: lungs clear to auscultation - no rales, rhonchi or wheezes  CV: regular rate and rhythm, normal S1 S2, no S3 or S4, no murmur, click or rub, no peripheral edema  ABDOMEN: soft, nontender, no hepatosplenomegaly, no masses and bowel sounds normal  MS: no gross musculoskeletal defects noted, no edema  NEURO: Normal strength and tone, mentation intact and speech normal  PSYCH: mentation appears normal, affect normal/bright         11/15/2024   Mini Cog   Clock Draw Score 2 Normal   3 Item Recall 3 objects recalled   Mini Cog Total Score 5                 Signed Electronically by: Lorraine Salgado PA-C

## 2024-11-15 NOTE — PATIENT INSTRUCTIONS
Your blood pressure was elevated at your appointment today. Elevated blood pressure can increase your risk of a heart attack, stroke and heart failure. For this reason, it is important to monitor your blood pressure closely. If you have access to a home blood pressure monitor or are able to check your blood pressure at a local pharmacy in the next week I would recommend doing so and scheduling follow-up if blood pressure is elevated.     How to Check Your Blood Pressure at Home    Make sure your blood pressure cuff is validated (produces accurate readings). Go to validateBP.org for a list of blood pressure cuffs.     Take your blood pressure right after you wake up and right before going to bed.     Make sure the readings are before you take your medications, smoke and after you empty your bladder.   Sit with good back support and feet flat on the floor. Have your blood pressure cuff resting at your heart level.     If your numbers are consistently above 140/90, please schedule a follow-up visit to discuss.        Patient Education   Preventive Care Advice   This is general advice given by our system to help you stay healthy. However, your care team may have specific advice just for you. Please talk to your care team about your preventive care needs.  Nutrition  Eat 5 or more servings of fruits and vegetables each day.  Try wheat bread, brown rice and whole grain pasta (instead of white bread, rice, and pasta).  Get enough calcium and vitamin D. Check the label on foods and aim for 100% of the RDA (recommended daily allowance).  Lifestyle  Exercise at least 150 minutes each week  (30 minutes a day, 5 days a week).  Do muscle strengthening activities 2 days a week. These help control your weight and prevent disease.  No smoking.  Wear sunscreen to prevent skin cancer.  Have a dental exam and cleaning every 6 months.  Yearly exams  See your health care team every year to talk about:  Any changes in your health.  Any  medicines your care team has prescribed.  Preventive care, family planning, and ways to prevent chronic diseases.  Shots (vaccines)   HPV shots (up to age 26), if you've never had them before.  Hepatitis B shots (up to age 59), if you've never had them before.  COVID-19 shot: Get this shot when it's due.  Flu shot: Get a flu shot every year.  Tetanus shot: Get a tetanus shot every 10 years.  Pneumococcal, hepatitis A, and RSV shots: Ask your care team if you need these based on your risk.  Shingles shot (for age 50 and up)  General health tests  Diabetes screening:  Starting at age 35, Get screened for diabetes at least every 3 years.  If you are younger than age 35, ask your care team if you should be screened for diabetes.  Cholesterol test: At age 39, start having a cholesterol test every 5 years, or more often if advised.  Bone density scan (DEXA): At age 50, ask your care team if you should have this scan for osteoporosis (brittle bones).  Hepatitis C: Get tested at least once in your life.  STIs (sexually transmitted infections)  Before age 24: Ask your care team if you should be screened for STIs.  After age 24: Get screened for STIs if you're at risk. You are at risk for STIs (including HIV) if:  You are sexually active with more than one person.  You don't use condoms every time.  You or a partner was diagnosed with a sexually transmitted infection.  If you are at risk for HIV, ask about PrEP medicine to prevent HIV.  Get tested for HIV at least once in your life, whether you are at risk for HIV or not.  Cancer screening tests  Cervical cancer screening: If you have a cervix, begin getting regular cervical cancer screening tests starting at age 21.  Breast cancer scan (mammogram): If you've ever had breasts, begin having regular mammograms starting at age 40. This is a scan to check for breast cancer.  Colon cancer screening: It is important to start screening for colon cancer at age 45.  Have a colonoscopy  test every 10 years (or more often if you're at risk) Or, ask your provider about stool tests like a FIT test every year or Cologuard test every 3 years.  To learn more about your testing options, visit:   .  For help making a decision, visit:   https://latricia.david/hf96712.  Prostate cancer screening test: If you have a prostate, ask your care team if a prostate cancer screening test (PSA) at age 55 is right for you.  Lung cancer screening: If you are a current or former smoker ages 50 to 80, ask your care team if ongoing lung cancer screenings are right for you.  For informational purposes only. Not to replace the advice of your health care provider. Copyright   2023 [employer]. All rights reserved. Clinically reviewed by the  [employer] [place] [facility]. [employer] 415395 - REV 01/24.  Hearing Loss: Care Instructions  Overview     Hearing loss is a sudden or slow decrease in how well you hear. It can range from slight to profound. Permanent hearing loss can occur with aging. It also can happen when you are exposed long-term to loud noise. Examples include listening to loud music, riding motorcycles, or being around other loud machines.  Hearing loss can affect your work and home life. It can make you feel lonely or depressed. You may feel that you have lost your independence. But hearing aids and other devices can help you hear better and feel connected to others.  Follow-up care is a key part of your treatment and safety. Be sure to make and go to all appointments, and call your doctor if you are having problems. It's also a good idea to know your test results and keep a list of the medicines you take.  How can you care for yourself at home?  Avoid loud noises whenever possible. This helps keep your hearing from getting worse.  Always wear hearing protection around loud noises.  Wear a hearing aid as directed.  A professional can help you pick a hearing aid that will work best for you.  You can  "also get hearing aids over the counter for mild to moderate hearing loss.  Have hearing tests as your doctor suggests. They can show whether your hearing has changed. Your hearing aid may need to be adjusted.  Use other devices as needed. These may include:  Telephone amplifiers and hearing aids that can connect to a television, stereo, radio, or microphone.  Devices that use lights or vibrations. These alert you to the doorbell, a ringing telephone, or a baby monitor.  Television closed-captioning. This shows the words at the bottom of the screen. Most new TVs can do this.  TTY (text telephone). This lets you type messages back and forth on the telephone instead of talking or listening. These devices are also called TDD. When messages are typed on the keyboard, they are sent over the phone line to a receiving TTY. The message is shown on a monitor.  Use text messaging, social media, and email if it is hard for you to communicate by telephone.  Try to learn a listening technique called speechreading. It is not lipreading. You pay attention to people's gestures, expressions, posture, and tone of voice. These clues can help you understand what a person is saying. Face the person you are talking to, and have them face you. Make sure the lighting is good. You need to see the other person's face clearly.  Think about counseling if you need help to adjust to your hearing loss.  When should you call for help?  Watch closely for changes in your health, and be sure to contact your doctor if:    You think your hearing is getting worse.     You have new symptoms, such as dizziness or nausea.   Where can you learn more?  Go to https://www.[employer].net/patiented  Enter R798 in the search box to learn more about \"Hearing Loss: Care Instructions.\"  Current as of: September 27, 2023  Content Version: 14.2 2024 [facility] [facility] [employer].   Care instructions adapted under license by your healthcare professional. If you have " questions about a medical condition or this instruction, always ask your healthcare professional. Healthwise, [facility] disclaims any warranty or liability for your use of this information.    Substance Use Disorder: Care Instructions  Overview     You can improve your life and health by stopping your use of alcohol or drugs. When you don't drink or use drugs, you may feel and sleep better. You may get along better with your family, friends, and coworkers. There are medicines and programs that can help with substance use disorder.  How can you care for yourself at home?  Here are some ways to help you stay sober and prevent relapse.  If you have been given medicine to help keep you sober or reduce your cravings, be sure to take it exactly as prescribed.  Talk to your doctor about programs that can help you stop using drugs or drinking alcohol.  Do not keep alcohol or drugs in your home.  Plan ahead. Think about what you'll say if other people ask you to drink or use drugs. Try not to spend time with people who drink or use drugs.  Use the time and money spent on drinking or drugs to do something that's important to you.  Preventing a relapse  Have a plan to deal with relapse. Learn to recognize changes in your thinking that lead you to drink or use drugs. Get help before you start to drink or use drugs again.  Try to stay away from situations, friends, or places that may lead you to drink or use drugs.  If you feel the need to drink alcohol or use drugs again, seek help right away. Call a trusted friend or family member. Some people get support from organizations such as Narcotics Anonymous or As It Is or from treatment facilities.  If you relapse, get help as soon as you can. Some people make a plan with another person that outlines what they want that person to do for them if they relapse. The plan usually includes how to handle the relapse and who to notify in case of relapse.  Don't give up. Remember  "that a relapse doesn't mean that you have failed. Use the experience to learn the triggers that lead you to drink or use drugs. Then quit again. Recovery is a lifelong process. Many people have several relapses before they are able to quit for good.  Follow-up care is a key part of your treatment and safety. Be sure to make and go to all appointments, and call your doctor if you are having problems. It's also a good idea to know your test results and keep a list of the medicines you take.  When should you call for help?   Call 911  anytime you think you may need emergency care. For example, call if you or someone else:    Has overdosed or has withdrawal signs. Be sure to tell the emergency workers that you are or someone else is using or trying to quit using drugs. Overdose or withdrawal signs may include:  Losing consciousness.  Seizure.  Seeing or hearing things that aren't there (hallucinations).     Is thinking or talking about suicide or harming others.   Where to get help 24 hours a day, 7 days a week   If you or someone you know talks about suicide, self-harm, a mental health crisis, a substance use crisis, or any other kind of emotional distress, get help right away. You can:    Call the Suicide and Crisis Lifeline at 988.     Call 0-393-087-TALK (1-996.241.9362).     Text HOME to 356061 to access the Crisis Text Line.   Consider saving these numbers in your phone.  Go to [employer] for more information or to chat online.  Call your doctor now or seek immediate medical care if:    You are having withdrawal symptoms. These may include nausea or vomiting, sweating, shakiness, and anxiety.   Watch closely for changes in your health, and be sure to contact your doctor if:    You have a relapse.     You need more help or support to stop.   Where can you learn more?  Go to https://www.healthwise.net/patiented  Enter H573 in the search box to learn more about \"Substance Use Disorder: Care " "Instructions.\"  Current as of: November 15, 2023  Content Version: 14.2 2024 [facility] [place], [facility].   Care instructions adapted under license by your healthcare professional. If you have questions about a medical condition or this instruction, always ask your healthcare professional. Healthwise, Inc[employer] disclaims any warranty or liability for your use of this information.       "

## 2024-12-26 ENCOUNTER — LAB (OUTPATIENT)
Dept: LAB | Facility: CLINIC | Age: 74
End: 2024-12-26
Payer: COMMERCIAL

## 2024-12-26 DIAGNOSIS — Z12.5 SCREENING FOR PROSTATE CANCER: ICD-10-CM

## 2024-12-26 DIAGNOSIS — E78.5 HYPERLIPIDEMIA LDL GOAL <160: ICD-10-CM

## 2024-12-26 LAB
CHOLEST SERPL-MCNC: 213 MG/DL
FASTING STATUS PATIENT QL REPORTED: YES
HDLC SERPL-MCNC: 86 MG/DL
LDLC SERPL CALC-MCNC: 115 MG/DL
NONHDLC SERPL-MCNC: 127 MG/DL
PSA SERPL DL<=0.01 NG/ML-MCNC: 2.3 NG/ML (ref 0–6.5)
TRIGL SERPL-MCNC: 59 MG/DL

## 2025-01-13 ENCOUNTER — OFFICE VISIT (OUTPATIENT)
Dept: DERMATOLOGY | Facility: CLINIC | Age: 75
End: 2025-01-13
Payer: COMMERCIAL

## 2025-01-13 DIAGNOSIS — L81.4 LENTIGINES: ICD-10-CM

## 2025-01-13 DIAGNOSIS — L57.8 ACTINIC SKIN DAMAGE: ICD-10-CM

## 2025-01-13 DIAGNOSIS — L82.0 INFLAMED SEBORRHEIC KERATOSIS: Primary | ICD-10-CM

## 2025-01-13 DIAGNOSIS — D22.9 MULTIPLE BENIGN NEVI: ICD-10-CM

## 2025-01-13 DIAGNOSIS — Z85.828 HISTORY OF BASAL CELL CARCINOMA: ICD-10-CM

## 2025-01-13 NOTE — LETTER
1/13/2025      Ignacio Sarmiento  [address]  [place] [address]      Dear Colleague,    Thank you for referring your patient, Ignacio Sarmiento, to the [facility]. Please see a copy of my visit note below.    [facility] Dermatology Note    Encounter Date: Jan 13, 2025    Dermatology Problem List:  #Hx NMSC   - bcc scalp 10/2023 ([facility])  - BCC x3 nose  2021  - BCC R cheek  2021    Major PMHx  -   ______________________________________    Impression/Plan:  Ignacio was seen today for skin check.    Diagnoses and all orders for this visit:    Inflamed seborrheic keratosis  -     DESTRUCT BENIGN LESION, UP TO 14    Lentigines  Actinic skin damage  Multiple benign nevi  - Reviewed the compounding benefits of incremental changes to sun protective clothing behaviors including increased frequency of sunscreen and sun protective clothing like broad brimmed hats and longsleeved UPF containing clothing    History of basal cell carcinoma  - No obvious evidence of recurrence at site of previous malignancy            Cryotherapy procedure note: After verbal consent and discussion of risks and benefits including but no limited to dyspigmentation/scar, blister, and pain, 1 ISK L temple was(were) treated with 1-2mm freeze border for 2 cycles with liquid nitrogen. Post cryotherapy instructions were provided.     Follow-up in 1 year .       Staff Involved:  Staff Only    Michel Paniagua MD   of Dermatology  Department of Dermatology  [facility]      CC:   Chief Complaint   Patient presents with     Skin Check     Full Body Exam        History of Present Illness:  Mr. Ignacio Sarmiento is a 74 year old male who presents as a return  patient.    Last seen 10/2023. Bx bcc on scalp, mohs by Ryan 12/2023    Labs:      Physical exam:  Vitals: There were no vitals taken for this visit.  GEN: well developed,  well-nourished, in no acute distress, in a pleasant mood.     SKIN: Calles phototype 1  - Full skin, which includes the head/face, both arms, chest, back, abdomen,both legs, genitalia and/or groin buttocks, digits and/or nails, was examined.  - Flat brown macules and patches in a sun exposed areas on face and extremities  - scattered brown papules on trunk and extremities   - No other lesions of concern on areas examined.     Past Medical History:   Past Medical History:   Diagnosis Date     Basal cell carcinoma      Cataracts, bilateral      Dyspepsia 10/18/2018     Elevated blood pressure reading without diagnosis of hypertension 10/18/2018     History of skin cancer 10/18/2018     Hyperlipidemia LDL goal <160 11/05/2018     Midline low back pain without sciatica 10/19/2018     Midline thoracic back pain, unspecified chronicity 10/18/2018     Mobitz I 08/12/2019     Primary osteoarthritis of ankle, unspecified laterality 11/09/2018     Seborrheic dermatitis 10/18/2018     Syncope 07/01/2019     Systolic hypertension 07/01/2019     Past Surgical History:   Procedure Laterality Date     ARTHROSCOPY KNEE Right      ARTHROSCOPY KNEE WITH MENISCECTOMY Left     open     COLONOSCOPY       COLONOSCOPY Left 9/22/2022    Procedure: COLONOSCOPY, FLEXIBLE, WITH POLYPECTOMIES REMOVAL USING COLD SNARE;  Surgeon: Hector Sinha MD;  Location: [facility]       Social History:   reports that he quit smoking about 47 years ago. His smoking use included cigarettes. He started smoking about 59 years ago. He has a 15 pack-year smoking history. He has never been exposed to tobacco smoke. He has never used smokeless tobacco. He reports current alcohol use. He reports that he does not currently use drugs after having used the following drugs: Marijuana.    Family History:  Family History   Problem Relation Age of Onset     Diabetes Sister         Type 2     Colon Cancer No family hx of        Medications:  Current Outpatient  Medications   Medication Sig Dispense Refill     methylPREDNISolone (MEDROL DOSEPAK) 4 MG tablet therapy pack Take packet in tapering dose, per protocol 21 tablet 0     Misc Natural Products (Tailored HEALTH PO) MOVE FREE ADVANCED       Multiple Vitamin (MULTI VITAMIN DAILY PO)        vitamin B-Complex Take 1 tablet by mouth daily       No Known Allergies              Again, thank you for allowing me to participate in the care of your patient.        Sincerely,        Michel Paniagua MD    Electronically signed

## 2025-01-13 NOTE — PROGRESS NOTES
[facility] Dermatology Note    Encounter Date: Jan 13, 2025    Dermatology Problem List:  #Hx NMSC   - bcc scalp 10/2023 ([facility])  - BCC x3 nose  2021  - BCC R cheek  2021    Major PMHx  -   ______________________________________    Impression/Plan:  Ignacio was seen today for skin check.    Diagnoses and all orders for this visit:    Inflamed seborrheic keratosis  -     DESTRUCT BENIGN LESION, UP TO 14    Lentigines  Actinic skin damage  Multiple benign nevi  - Reviewed the compounding benefits of incremental changes to sun protective clothing behaviors including increased frequency of sunscreen and sun protective clothing like broad brimmed hats and longsleeved UPF containing clothing    History of basal cell carcinoma  - No obvious evidence of recurrence at site of previous malignancy            Cryotherapy procedure note: After verbal consent and discussion of risks and benefits including but no limited to dyspigmentation/scar, blister, and pain, 1 ISK L temple was(were) treated with 1-2mm freeze border for 2 cycles with liquid nitrogen. Post cryotherapy instructions were provided.     Follow-up in 1 year .       Staff Involved:  Staff Only    Michel Paniagua MD   of Dermatology  Department of Dermatology  [facility]      CC:   Chief Complaint   Patient presents with    Skin Check     Full Body Exam        History of Present Illness:  Mr. Ignacio Sarmiento is a 74 year old male who presents as a return  patient.    Last seen 10/2023. Bx bcc on scalp, mohs by Ryan 12/2023    Labs:      Physical exam:  Vitals: There were no vitals taken for this visit.  GEN: well developed, well-nourished, in no acute distress, in a pleasant mood.     SKIN: Calles phototype 1  - Full skin, which includes the head/face, both arms, chest, back, abdomen,both legs, genitalia and/or groin buttocks, digits and/or nails, was examined.  - Flat brown macules and  patches in a sun exposed areas on face and extremities  - scattered brown papules on trunk and extremities   - No other lesions of concern on areas examined.     Past Medical History:   Past Medical History:   Diagnosis Date    Basal cell carcinoma     Cataracts, bilateral     Dyspepsia 10/18/2018    Elevated blood pressure reading without diagnosis of hypertension 10/18/2018    History of skin cancer 10/18/2018    Hyperlipidemia LDL goal <160 11/05/2018    Midline low back pain without sciatica 10/19/2018    Midline thoracic back pain, unspecified chronicity 10/18/2018    Mobitz I 08/12/2019    Primary osteoarthritis of ankle, unspecified laterality 11/09/2018    Seborrheic dermatitis 10/18/2018    Syncope 07/01/2019    Systolic hypertension 07/01/2019     Past Surgical History:   Procedure Laterality Date    ARTHROSCOPY KNEE Right     ARTHROSCOPY KNEE WITH MENISCECTOMY Left     open    COLONOSCOPY      COLONOSCOPY Left 9/22/2022    Procedure: COLONOSCOPY, FLEXIBLE, WITH POLYPECTOMIES REMOVAL USING COLD SNARE;  Surgeon: Hector Sinha MD;  Location:  [facility]       Social History:   reports that he quit smoking about 47 years ago. His smoking use included cigarettes. He started smoking about 59 years ago. He has a 15 pack-year smoking history. He has never been exposed to tobacco smoke. He has never used smokeless tobacco. He reports current alcohol use. He reports that he does not currently use drugs after having used the following drugs: Marijuana.    Family History:  Family History   Problem Relation Age of Onset    Diabetes Sister         Type 2    Colon Cancer No family hx of        Medications:  Current Outpatient Medications   Medication Sig Dispense Refill    methylPREDNISolone (MEDROL DOSEPAK) 4 MG tablet therapy pack Take packet in tapering dose, per protocol 21 tablet 0    Misc Natural Products (JOINT HEALTH PO) MOVE FREE ADVANCED      Multiple Vitamin (MULTI VITAMIN DAILY PO)       vitamin B-Complex  Take 1 tablet by mouth daily       No Known Allergies

## 2025-01-19 ENCOUNTER — HOSPITAL ENCOUNTER (OUTPATIENT)
Dept: MRI IMAGING | Facility: CLINIC | Age: 75
Discharge: HOME | End: 2025-01-19
Attending: PHYSICIAN ASSISTANT | Admitting: PHYSICIAN ASSISTANT
Payer: COMMERCIAL

## 2025-01-19 DIAGNOSIS — D18.03 CAVERNOUS HEMANGIOMA OF LIVER: ICD-10-CM

## 2025-01-19 PROCEDURE — 255N000002 HC RX 255 OP 636: Performed by: PHYSICIAN ASSISTANT

## 2025-01-19 PROCEDURE — A9585 GADOBUTROL INJECTION: HCPCS | Performed by: PHYSICIAN ASSISTANT

## 2025-01-19 PROCEDURE — 74183 MRI ABD W/O CNTR FLWD CNTR: CPT

## 2025-01-19 RX ORDER — GADOBUTROL 604.72 MG/ML
7.5 INJECTION INTRAVENOUS ONCE
Status: COMPLETED | OUTPATIENT
Start: 2025-01-19 | End: 2025-01-19

## 2025-01-19 RX ADMIN — GADOBUTROL 7.5 ML: 604.72 INJECTION INTRAVENOUS at 10:03

## 2025-01-20 PROBLEM — D18.03 CAVERNOUS HEMANGIOMA OF LIVER: Status: ACTIVE | Noted: 2024-11-15

## 2025-02-11 PROBLEM — E78.5 HYPERLIPIDEMIA LDL GOAL <160: Status: ACTIVE | Noted: 2018-11-05

## 2025-03-23 ENCOUNTER — HEALTH MAINTENANCE LETTER (OUTPATIENT)
Age: 75
End: 2025-03-23

## 2025-04-15 ENCOUNTER — TELEPHONE (OUTPATIENT)
Dept: FAMILY MEDICINE | Facility: CLINIC | Age: 75
End: 2025-04-15

## 2025-04-15 NOTE — TELEPHONE ENCOUNTER
Patient Quality Outreach    Patient is due for the following:   Hypertension -  Hypertension follow-up visit    Action(s) Taken:   Schedule a office visit for Hypertension    Type of outreach:    Sent [employer] message.    Questions for provider review:    None         Wanda Turpin MA  Chart routed to None.

## 2025-05-25 DIAGNOSIS — H01.001 BLEPHARITIS OF RIGHT UPPER EYELID, UNSPECIFIED TYPE: ICD-10-CM

## 2025-05-25 NOTE — TELEPHONE ENCOUNTER
Disp Refills Start End NORA    erythromycin (ROMYCIN) 5 MG/GM ophthalmic ointment 3.5 g 0 3/22/2021 3/29/2021 --   Sig - Route: Place 0.25 inches into the right eye At Bedtime for 7

## 2025-05-27 RX ORDER — ERYTHROMYCIN 5 MG/G
0.25 OINTMENT OPHTHALMIC AT BEDTIME
Qty: 3.5 G | Refills: 0 | OUTPATIENT
Start: 2025-05-27

## 2025-05-27 NOTE — TELEPHONE ENCOUNTER
Not prescribed since 2021. Needs appointment for further refills. Refill declined.    Selena Cotton RN on 5/27/2025 at 1:42 PM

## (undated) DEVICE — CLIP HEMOSTASIS ASSURANCE W16 MM BX00711884

## (undated) DEVICE — KIT ENDO TURNOVER/PROCEDURE W/CLEAN A SCOPE LINERS 103888

## (undated) DEVICE — ENDO SNARE EXACTO COLD 9MM LOOP 2.4MMX230CM 00711115

## (undated) DEVICE — ENDO TRAP POLYP QUICK CATCH 710201

## (undated) RX ORDER — FENTANYL CITRATE 0.05 MG/ML
INJECTION, SOLUTION INTRAMUSCULAR; INTRAVENOUS
Status: DISPENSED
Start: 2022-09-22